# Patient Record
Sex: FEMALE | Race: BLACK OR AFRICAN AMERICAN | NOT HISPANIC OR LATINO | Employment: FULL TIME | URBAN - METROPOLITAN AREA
[De-identification: names, ages, dates, MRNs, and addresses within clinical notes are randomized per-mention and may not be internally consistent; named-entity substitution may affect disease eponyms.]

---

## 2017-11-28 ENCOUNTER — HISTORICAL (OUTPATIENT)
Dept: INTERNAL MEDICINE | Facility: CLINIC | Age: 27
End: 2017-11-28

## 2017-11-28 LAB
ABS NEUT (OLG): 3.28 X10(3)/MCL (ref 2.1–9.2)
ALBUMIN SERPL-MCNC: 3.8 GM/DL (ref 3.4–5)
ALBUMIN/GLOB SERPL: 1 RATIO (ref 1–2)
ALP SERPL-CCNC: 78 UNIT/L (ref 45–117)
ALT SERPL-CCNC: 20 UNIT/L (ref 12–78)
AST SERPL-CCNC: 15 UNIT/L (ref 15–37)
BASOPHILS # BLD AUTO: 0.04 X10(3)/MCL
BASOPHILS NFR BLD AUTO: 1 % (ref 0–1)
BILIRUB SERPL-MCNC: 0.2 MG/DL (ref 0.2–1)
BILIRUBIN DIRECT+TOT PNL SERPL-MCNC: <0.1 MG/DL
BILIRUBIN DIRECT+TOT PNL SERPL-MCNC: ABNORMAL MG/DL
BUN SERPL-MCNC: 8 MG/DL (ref 7–18)
CALCIUM SERPL-MCNC: 8.9 MG/DL (ref 8.5–10.1)
CHLORIDE SERPL-SCNC: 105 MMOL/L (ref 98–107)
CO2 SERPL-SCNC: 29 MMOL/L (ref 21–32)
CREAT SERPL-MCNC: 0.8 MG/DL (ref 0.6–1.3)
EOSINOPHIL # BLD AUTO: 0.04 10*3/UL
EOSINOPHIL NFR BLD AUTO: 1 % (ref 0–5)
ERYTHROCYTE [DISTWIDTH] IN BLOOD BY AUTOMATED COUNT: 20.3 % (ref 11.5–14.5)
FERRITIN SERPL-MCNC: 5.3 NG/ML (ref 10–150)
GLOBULIN SER-MCNC: 4.6 GM/ML (ref 2.3–3.5)
GLUCOSE SERPL-MCNC: 88 MG/DL (ref 74–106)
HCT VFR BLD AUTO: 35.2 % (ref 35–46)
HGB BLD-MCNC: 10.7 GM/DL (ref 12–16)
IMM GRANULOCYTES # BLD AUTO: 0.01 10*3/UL
IMM GRANULOCYTES NFR BLD AUTO: 0 %
IRON SATN MFR SERPL: 11.1 % (ref 15–50)
IRON SERPL-MCNC: 45 MCG/DL (ref 50–170)
LYMPHOCYTES # BLD AUTO: 2.38 X10(3)/MCL
LYMPHOCYTES NFR BLD AUTO: 36 % (ref 15–40)
MCH RBC QN AUTO: 21.5 PG (ref 26–34)
MCHC RBC AUTO-ENTMCNC: 30.4 GM/DL (ref 31–37)
MCV RBC AUTO: 70.8 FL (ref 80–100)
MONOCYTES # BLD AUTO: 0.78 X10(3)/MCL
MONOCYTES NFR BLD AUTO: 12 % (ref 4–12)
NEUTROPHILS # BLD AUTO: 3.28 X10(3)/MCL
NEUTROPHILS NFR BLD AUTO: 50 X10(3)/MCL
PLATELET # BLD AUTO: 318 X10(3)/MCL (ref 130–400)
PMV BLD AUTO: 10.5 FL (ref 7.4–10.4)
POTASSIUM SERPL-SCNC: 4.3 MMOL/L (ref 3.5–5.1)
PROT SERPL-MCNC: 8.4 GM/DL (ref 6.4–8.2)
RBC # BLD AUTO: 4.97 X10(6)/MCL (ref 4–5.2)
RET# (OHS): 0.06 X10(6)/MCL (ref 0.02–0.08)
RETICULOCYTE COUNT AUTOMATED (OLG): 1.2 % (ref 0.5–1.5)
SODIUM SERPL-SCNC: 139 MMOL/L (ref 136–145)
TIBC SERPL-MCNC: 406 MCG/DL (ref 250–450)
TRANSFERRIN SERPL-MCNC: 345 MG/DL (ref 200–360)
WBC # SPEC AUTO: 6.5 X10(3)/MCL (ref 4.5–11)

## 2018-06-06 ENCOUNTER — HISTORICAL (OUTPATIENT)
Dept: ADMINISTRATIVE | Facility: HOSPITAL | Age: 28
End: 2018-06-06

## 2018-06-06 LAB
ABS NEUT (OLG): 2.5 X10(3)/MCL (ref 2.1–9.2)
BASOPHILS # BLD AUTO: 0.04 X10(3)/MCL
BASOPHILS NFR BLD AUTO: 1 %
BUN SERPL-MCNC: 10 MG/DL (ref 7–18)
CALCIUM SERPL-MCNC: 9 MG/DL (ref 8.5–10.1)
CHLORIDE SERPL-SCNC: 104 MMOL/L (ref 98–107)
CO2 SERPL-SCNC: 29 MMOL/L (ref 21–32)
CREAT SERPL-MCNC: 0.8 MG/DL (ref 0.6–1.3)
CREAT/UREA NIT SERPL: 12
EOSINOPHIL # BLD AUTO: 0.05 X10(3)/MCL
EOSINOPHIL NFR BLD AUTO: 1 %
ERYTHROCYTE [DISTWIDTH] IN BLOOD BY AUTOMATED COUNT: 15.3 % (ref 11.5–14.5)
GLUCOSE SERPL-MCNC: 80 MG/DL (ref 74–106)
HCT VFR BLD AUTO: 38.4 % (ref 35–46)
HGB BLD-MCNC: 11.7 GM/DL (ref 12–16)
IMM GRANULOCYTES # BLD AUTO: 0.01 10*3/UL
IMM GRANULOCYTES NFR BLD AUTO: 0 %
LYMPHOCYTES # BLD AUTO: 2.83 X10(3)/MCL
LYMPHOCYTES NFR BLD AUTO: 46 % (ref 13–40)
MCH RBC QN AUTO: 22.7 PG (ref 26–34)
MCHC RBC AUTO-ENTMCNC: 30.5 GM/DL (ref 31–37)
MCV RBC AUTO: 74.6 FL (ref 80–100)
MONOCYTES # BLD AUTO: 0.72 X10(3)/MCL
MONOCYTES NFR BLD AUTO: 12 % (ref 4–12)
NEUTROPHILS # BLD AUTO: 2.5 X10(3)/MCL
NEUTROPHILS NFR BLD AUTO: 41 X10(3)/MCL
PLATELET # BLD AUTO: 291 X10(3)/MCL (ref 130–400)
PMV BLD AUTO: 10.5 FL (ref 7.4–10.4)
POTASSIUM SERPL-SCNC: 4.1 MMOL/L (ref 3.5–5.1)
RBC # BLD AUTO: 5.15 X10(6)/MCL (ref 4–5.2)
SODIUM SERPL-SCNC: 140 MMOL/L (ref 136–145)
WBC # SPEC AUTO: 6.2 X10(3)/MCL (ref 4.5–11)

## 2021-04-12 LAB
PAP RECOMMENDATION EXT: NORMAL
PAP SMEAR: NORMAL

## 2021-05-17 ENCOUNTER — HISTORICAL (OUTPATIENT)
Dept: RADIOLOGY | Facility: HOSPITAL | Age: 31
End: 2021-05-17

## 2021-05-17 ENCOUNTER — HISTORICAL (OUTPATIENT)
Dept: ADMINISTRATIVE | Facility: HOSPITAL | Age: 31
End: 2021-05-17

## 2021-05-17 LAB
ABS NEUT (OLG): 2.74 X10(3)/MCL (ref 2.1–9.2)
ALBUMIN SERPL-MCNC: 4.1 GM/DL (ref 3.5–5)
ALBUMIN/GLOB SERPL: 1.2 RATIO (ref 1.1–2)
ALP SERPL-CCNC: 74 UNIT/L (ref 40–150)
ALT SERPL-CCNC: 25 UNIT/L (ref 0–55)
APPEARANCE, UA: ABNORMAL
AST SERPL-CCNC: 21 UNIT/L (ref 5–34)
BACTERIA SPEC CULT: ABNORMAL /HPF
BASOPHILS # BLD AUTO: 0.1 X10(3)/MCL (ref 0–0.2)
BASOPHILS NFR BLD AUTO: 1 %
BILIRUB SERPL-MCNC: 0.3 MG/DL
BILIRUB UR QL STRIP: NEGATIVE
BILIRUBIN DIRECT+TOT PNL SERPL-MCNC: 0.1 MG/DL (ref 0–0.5)
BILIRUBIN DIRECT+TOT PNL SERPL-MCNC: 0.2 MG/DL (ref 0–0.8)
BUN SERPL-MCNC: 8.9 MG/DL (ref 7–18.7)
CALCIUM SERPL-MCNC: 9.2 MG/DL (ref 8.4–10.2)
CHLORIDE SERPL-SCNC: 104 MMOL/L (ref 98–107)
CHOLEST SERPL-MCNC: 152 MG/DL
CHOLEST/HDLC SERPL: 3 {RATIO} (ref 0–5)
CO2 SERPL-SCNC: 26 MMOL/L (ref 22–29)
COLOR UR: YELLOW
CREAT SERPL-MCNC: 0.82 MG/DL (ref 0.55–1.02)
EOSINOPHIL # BLD AUTO: 0.2 X10(3)/MCL (ref 0–0.9)
EOSINOPHIL NFR BLD AUTO: 3 %
ERYTHROCYTE [DISTWIDTH] IN BLOOD BY AUTOMATED COUNT: 16 % (ref 11.5–17)
EST. AVERAGE GLUCOSE BLD GHB EST-MCNC: 116.9 MG/DL
GLOBULIN SER-MCNC: 3.5 GM/DL (ref 2.4–3.5)
GLUCOSE (UA): NEGATIVE
GLUCOSE SERPL-MCNC: 90 MG/DL (ref 74–100)
HBA1C MFR BLD: 5.7 %
HCT VFR BLD AUTO: 37.9 % (ref 37–47)
HDLC SERPL-MCNC: 54 MG/DL (ref 35–60)
HGB BLD-MCNC: 11.4 GM/DL (ref 12–16)
HGB UR QL STRIP: NEGATIVE
IRON SERPL-MCNC: 107 UG/DL (ref 50–170)
KETONES UR QL STRIP: NEGATIVE
LDLC SERPL CALC-MCNC: 83 MG/DL (ref 50–140)
LEUKOCYTE ESTERASE UR QL STRIP: NEGATIVE
LYMPHOCYTES # BLD AUTO: 3.3 X10(3)/MCL (ref 0.6–4.6)
LYMPHOCYTES NFR BLD AUTO: 45 %
MCH RBC QN AUTO: 23.2 PG (ref 27–31)
MCHC RBC AUTO-ENTMCNC: 30.1 GM/DL (ref 33–36)
MCV RBC AUTO: 77 FL (ref 80–94)
MONOCYTES # BLD AUTO: 1 X10(3)/MCL (ref 0.1–1.3)
MONOCYTES NFR BLD AUTO: 13 %
NEUTROPHILS # BLD AUTO: 2.74 X10(3)/MCL (ref 2.1–9.2)
NEUTROPHILS NFR BLD AUTO: 37 %
NITRITE UR QL STRIP: NEGATIVE
PH UR STRIP: 7 [PH] (ref 5–9)
PLATELET # BLD AUTO: 331 X10(3)/MCL (ref 130–400)
PMV BLD AUTO: 11.3 FL (ref 9.4–12.4)
POTASSIUM SERPL-SCNC: 3.9 MMOL/L (ref 3.5–5.1)
PROT SERPL-MCNC: 7.6 GM/DL (ref 6.4–8.3)
PROT UR QL STRIP: NEGATIVE
RBC # BLD AUTO: 4.92 X10(6)/MCL (ref 4.2–5.4)
RBC #/AREA URNS HPF: ABNORMAL /[HPF]
SODIUM SERPL-SCNC: 140 MMOL/L (ref 136–145)
SP GR UR STRIP: 1.02 (ref 1–1.03)
SQUAMOUS EPITHELIAL, UA: ABNORMAL /HPF (ref 0–4)
TRIGL SERPL-MCNC: 73 MG/DL (ref 37–140)
TSH SERPL-ACNC: 3.04 UIU/ML (ref 0.35–4.94)
UROBILINOGEN UR STRIP-ACNC: 1
VIT B12 SERPL-MCNC: 492 PG/ML (ref 213–816)
VLDLC SERPL CALC-MCNC: 15 MG/DL
WBC # SPEC AUTO: 7.4 X10(3)/MCL (ref 4.5–11.5)
WBC #/AREA URNS HPF: ABNORMAL /[HPF]

## 2022-01-20 ENCOUNTER — HISTORICAL (OUTPATIENT)
Dept: LAB | Facility: HOSPITAL | Age: 32
End: 2022-01-20

## 2022-01-20 LAB
ABS NEUT (OLG): 2.53 X10(3)/MCL (ref 2.1–9.2)
ALBUMIN SERPL-MCNC: 4 GM/DL (ref 3.5–5)
ALBUMIN/GLOB SERPL: 1.1 RATIO (ref 1.1–2)
ALP SERPL-CCNC: 68 UNIT/L (ref 40–150)
ALT SERPL-CCNC: 9 UNIT/L (ref 0–55)
AST SERPL-CCNC: 13 UNIT/L (ref 5–34)
BASOPHILS # BLD AUTO: 0.06 X10(3)/MCL (ref 0–0.2)
BASOPHILS NFR BLD AUTO: 1 % (ref 0–1)
BILIRUB SERPL-MCNC: 0.2 MG/DL (ref 0.2–1.2)
BILIRUBIN DIRECT+TOT PNL SERPL-MCNC: 0.1 MG/DL (ref 0–0.5)
BILIRUBIN DIRECT+TOT PNL SERPL-MCNC: 0.1 MG/DL (ref 0–0.8)
BUN SERPL-MCNC: 6.6 MG/DL (ref 7–18.7)
CALCIUM SERPL-MCNC: 9.1 MG/DL (ref 8.4–10.2)
CHLORIDE SERPL-SCNC: 106 MMOL/L (ref 98–107)
CO2 SERPL-SCNC: 28 MMOL/L (ref 22–29)
CREAT SERPL-MCNC: 0.81 MG/DL (ref 0.57–1.11)
EOSINOPHIL # BLD AUTO: 0.19 X10(3)/MCL (ref 0–0.9)
EOSINOPHIL NFR BLD AUTO: 3.3 % (ref 0–6.4)
ERYTHROCYTE [DISTWIDTH] IN BLOOD BY AUTOMATED COUNT: 15 % (ref 11.5–17)
EST. AVERAGE GLUCOSE BLD GHB EST-MCNC: 114 MG/DL
GLOBULIN SER-MCNC: 3.5 GM/DL (ref 2.4–3.5)
GLUCOSE SERPL-MCNC: 90 MG/DL (ref 74–100)
HBA1C MFR BLD: 5.6 %
HCT VFR BLD AUTO: 35.4 % (ref 37–47)
HGB BLD-MCNC: 11.1 GM/DL (ref 12–16)
IMM GRANULOCYTES # BLD AUTO: 0.01 10*3/UL (ref 0–0.02)
IMM GRANULOCYTES NFR BLD AUTO: 0.2 % (ref 0–0.43)
IRON SERPL-MCNC: 32 UG/DL (ref 50–170)
LYMPHOCYTES # BLD AUTO: 2.36 X10(3)/MCL (ref 0.6–4.6)
LYMPHOCYTES NFR BLD AUTO: 40.9 % (ref 16–44)
MCH RBC QN AUTO: 23.5 PG (ref 27–31)
MCHC RBC AUTO-ENTMCNC: 31.4 GM/DL (ref 33–36)
MCV RBC AUTO: 75 FL (ref 80–94)
MONOCYTES # BLD AUTO: 0.62 X10(3)/MCL (ref 0.1–1.3)
MONOCYTES NFR BLD AUTO: 10.7 % (ref 4–12.1)
NEUTROPHILS # BLD AUTO: 2.53 X10(3)/MCL (ref 2.1–9.2)
NEUTROPHILS NFR BLD AUTO: 43.9 % (ref 43–73)
NRBC BLD AUTO-RTO: 0 % (ref 0–0.2)
PLATELET # BLD AUTO: 318 X10(3)/MCL (ref 130–400)
PMV BLD AUTO: 10.7 FL (ref 7.4–10.4)
POTASSIUM SERPL-SCNC: 3.9 MMOL/L (ref 3.5–5.1)
PROT SERPL-MCNC: 7.5 GM/DL (ref 6.4–8.3)
RBC # BLD AUTO: 4.72 X10(6)/MCL (ref 4.2–5.4)
SODIUM SERPL-SCNC: 141 MMOL/L (ref 136–145)
WBC # SPEC AUTO: 5.8 X10(3)/MCL (ref 4.5–11.5)

## 2022-01-31 ENCOUNTER — HISTORICAL (OUTPATIENT)
Dept: ADMINISTRATIVE | Facility: HOSPITAL | Age: 32
End: 2022-01-31

## 2022-04-10 ENCOUNTER — HISTORICAL (OUTPATIENT)
Dept: ADMINISTRATIVE | Facility: HOSPITAL | Age: 32
End: 2022-04-10
Payer: COMMERCIAL

## 2022-04-29 VITALS
DIASTOLIC BLOOD PRESSURE: 68 MMHG | OXYGEN SATURATION: 98 % | HEIGHT: 66 IN | SYSTOLIC BLOOD PRESSURE: 101 MMHG | SYSTOLIC BLOOD PRESSURE: 124 MMHG | BODY MASS INDEX: 31.78 KG/M2 | DIASTOLIC BLOOD PRESSURE: 78 MMHG | WEIGHT: 197.75 LBS | WEIGHT: 194.69 LBS | HEIGHT: 66 IN | BODY MASS INDEX: 31.29 KG/M2

## 2022-05-03 NOTE — HISTORICAL [facility]
This is a historical note converted from Ken. Formatting and pictures may have been removed.  Please reference Ken for original formatting and attached multimedia. Chief Complaint  Follow up appt  History of Present Illness  28-year-old -American female presents for follow-up appointment.? Today she is complaining of?right knee pain which has been ongoing for quite some time now.? Patient denies history of trauma?and says that it normally hurts when she sits or stands for too long. ?She has tried over-the-counter Advil only but says she does like to take medications.  She has been compliant with her ferrous sulfate but tends to take it only twice a day as?3 times daily causes her to become too constipated. ?However, she has not tried Colace or MiraLAX over-the-counter.  Review of Systems  ????Constitutional: No fever, No chills, No weakness, No fatigue.  ?????Eye: No recent visual problem.  ?????Respiratory: No shortness of breath, No cough, No sputum production, No wheezing.  ?????Cardiovascular: No chest pain, No palpitations, No peripheral edema.  ?????Gastrointestinal: No nausea, No vomiting, No diarrhea, No constipation, No heartburn, No abdominal pain.  ?????Genitourinary: No dysuria.  ?????Endocrine: No excessive thirst, No polyuria, No cold intolerance, No heat intolerance.  ?????Musculoskeletal: No back pain, right knee?pain, No decreased range of motion.  ?????Integumentary: No rash, No pruritus.  ?????Neurologic: Alert and oriented X4, No numbness, No tingling, No headache.  ?????Psychiatric: No anxiety, No depression.  Physical Exam  Vitals & Measurements  T:?36.9? ?C (Oral)? HR:?76(Peripheral)? RR:?18? BP:?101/68?  HT:?168?cm? HT:?168?cm? HT:?168?cm? WT:?88.3?kg? WT:?88.3?kg? WT:?88.3?kg? BMI:?31.29?  General: Alert and oriented, No acute distress.  ?????Eye: Pupils are equal, round and reactive to light, Extraocular movements are intact, Normal conjunctiva.  ?????HENT: Normocephalic, Oral  mucosa is moist, No pharyngeal erythema.  ?????Neck: Supple, Non-tender.  ?????Respiratory: Lungs are clear to auscultation, Respirations are non-labored.  ?????Cardiovascular: Normal rate, Regular rhythm, No murmur, Good pulses equal in all extremities, No edema.  ?????Gastrointestinal: Soft, Non-tender, Non-distended, Normal bowel sounds.  ?????Musculoskeletal:?Right knee reveals no?edema, no?erythema,?no tenderness to palpation, no crepitus,?normal range of motion, Normal strength.  ?????Integumentary: Warm, Dry.  ?????Neurologic: Alert, Oriented.  ?????Cognition and Speech: Oriented, Speech clear and coherent.  ?????Psychiatric: Cooperative, Appropriate mood & affect.  Assessment/Plan  Ordered:  ferrous sulfate, 325 mg = 1 tab(s), Oral, BID, # 60 tab(s), 11 Refill(s), Pharmacy: [id number]  Basic Metabolic Panel, Routine collect, *Est. 06/06/18 3:00:00 CDT, Blood, Order for future visit, *Est. Stop date 06/06/18 3:00:00 CDT, Lab Collect, Anemia  Restless leg syndrome, 06/06/18 11:18:00 CDT  CBC w/ Auto Diff, Routine collect, *Est. 05/06/19 3:00:00 CDT, Blood, Order for future visit, *Est. Stop date 05/06/19 3:00:00 CDT, Lab Collect, Anemia  Restless leg syndrome  Wellness examination, 06/06/18 11:19:00 CDT  CBC w/ Auto Diff, Routine collect, *Est. 06/06/18 3:00:00 CDT, Blood, Order for future visit, *Est. Stop date 06/06/18 3:00:00 CDT, Lab Collect, Anemia  Restless leg syndrome, 06/06/18 11:18:00 CDT  Clinic Follow up, *Est. 05/13/19 7:30:00 CDT, Order for future visit, Restless leg syndrome, [facility]  Comprehensive Metabolic Panel, Routine collect, *Est. 05/06/19 3:00:00 CDT, Blood, Order for future visit, *Est. Stop date 05/06/19 3:00:00 CDT, Lab Collect, Anemia  Restless leg syndrome  Wellness examination, 06/06/18 11:19:00 CDT  XR Knee Right 1 or 2 Views, Routine, *Est. 06/06/18 3:00:00 CDT, Pain, None, Ambulatory, Rad Type, Order for future visit, Right knee pain, *Est. 06/06/18 3:00:00  CDT  ?  1.? Anemia:?CBC with differential and BMP today.? May continue ferrous sulfate?supplementation.  2.? Migraines?without aura:?Stable on Fioricet.  3.? Restless leg syndrome:?Improved and stable.  4.? Wellness: Labs as above 1 week prior to follow-up appointment in 11 months.  5.? Chronic?right knee pain: X-ray today. ?Advised patient on alternating Aleve with Tylenol over-the-counter as needed only.  [1]  Patient voiced understanding.   Problem List/Past Medical History  Ongoing  Obesity  S/P tubal ligation  Historical  none   delivery  Procedure/Surgical History  Other bilateral ligation and division of fallopian tubes (2013), Other manually assisted delivery (2013), DIRECT ADMISSION OF PATIENT FOR HOSPITAL OBSERVATION CARE (2013), HOSPITAL OBSERVATION SERVICE, PER HOUR (2013), DIRECT ADMISSION OF PATIENT FOR HOSPITAL OBSERVATION CARE (2013), HOSPITAL OBSERVATION SERVICE, PER HOUR (2013), Tubal ligation (), Medical induction of labor (2012), Other manually assisted delivery (2012), DIRECT ADMISSION OF PATIENT FOR HOSPITAL OBSERVATION CARE (2012), HOSPITAL OBSERVATION SERVICE, PER HOUR (2012), DIRECT ADMISSION OF PATIENT FOR HOSPITAL OBSERVATION CARE (2012), HOSPITAL OBSERVATION SERVICE, PER HOUR (2012), tonsilectomy (2007).  Medications  acetaminophen/butalbital/caffeine 325 mg-50 mg-40 mg oral tablet, 1 tab(s), Oral, q4hr, PRN, 1 refills  ferrous sulfate 325 mg (65 mg elemental iron) oral tablet, 325 mg= 1 tab(s), Oral, TID, 5 refills  Allergies  No Known Allergies  Social History  Alcohol - No Risk, 2012  Never, 2017  Employment/School - No Risk, 2012  Employed, Highest education level: High school., 2017  Exercise  Exercise frequency: 1-2 times/week. Exercise type: cycling ., 2017  Home/Environment - No Risk, 2012  Lives with Children, Spouse. Living situation: Home/Independent.  Alcohol abuse in household: No. Substance abuse in household: No. Smoker in household: No. Feels unsafe at home: No. Safe place to go: Yes. Family/Friends available for support: Yes. Concern for family members at home: No. Major illness in household: No., 11/28/2017  Nutrition/Health - No Risk, 05/08/2012  Regular, Wants to lose weight: Yes. Sleeping concerns: Yes. Feels highly stressed: No., 11/28/2017  Substance Abuse - No Risk, 05/08/2012  Never, 09/21/2017  Tobacco - No Risk, 05/08/2012  Never smoker, 09/21/2017  Family History  Diabetes mellitus type 1: Sister.  Healthy adult: Mother.  Sickle cell trait: Father.  Ulcerative colitis.: Brother.  Immunizations  Vaccine Date Status   influenza virus vaccine, inactivated 11/28/2017 Given      [1]?IM Office Visit Note; Jennifer Smalls MD 11/28/2017 08:23 CST

## 2022-05-17 ENCOUNTER — OFFICE VISIT (OUTPATIENT)
Dept: FAMILY MEDICINE | Facility: CLINIC | Age: 32
End: 2022-05-17
Payer: COMMERCIAL

## 2022-05-17 VITALS
SYSTOLIC BLOOD PRESSURE: 112 MMHG | WEIGHT: 200.63 LBS | TEMPERATURE: 98 F | OXYGEN SATURATION: 99 % | RESPIRATION RATE: 16 BRPM | DIASTOLIC BLOOD PRESSURE: 72 MMHG | BODY MASS INDEX: 32.24 KG/M2 | HEART RATE: 72 BPM | HEIGHT: 66 IN

## 2022-05-17 DIAGNOSIS — E66.9 OBESITY (BMI 30-39.9): ICD-10-CM

## 2022-05-17 DIAGNOSIS — D50.8 OTHER IRON DEFICIENCY ANEMIA: ICD-10-CM

## 2022-05-17 DIAGNOSIS — Z00.00 ENCOUNTER FOR PREVENTATIVE ADULT HEALTH CARE EXAMINATION: Primary | ICD-10-CM

## 2022-05-17 PROBLEM — M25.561 RIGHT KNEE PAIN: Chronic | Status: ACTIVE | Noted: 2022-05-17

## 2022-05-17 PROBLEM — R73.03 PREDIABETES: Status: RESOLVED | Noted: 2022-05-17 | Resolved: 2022-05-17

## 2022-05-17 PROBLEM — R73.03 PREDIABETES: Chronic | Status: ACTIVE | Noted: 2022-05-17

## 2022-05-17 PROBLEM — M25.511 RIGHT SHOULDER PAIN: Chronic | Status: ACTIVE | Noted: 2022-05-17

## 2022-05-17 PROBLEM — D50.9 IRON DEFICIENCY ANEMIA: Chronic | Status: ACTIVE | Noted: 2022-05-17

## 2022-05-17 PROBLEM — M25.511 RIGHT SHOULDER PAIN: Status: ACTIVE | Noted: 2022-05-17

## 2022-05-17 PROBLEM — M41.9 SCOLIOSIS: Chronic | Status: ACTIVE | Noted: 2022-05-17

## 2022-05-17 PROBLEM — M54.50 LOW BACK PAIN: Status: ACTIVE | Noted: 2022-05-17

## 2022-05-17 PROBLEM — M54.50 LOW BACK PAIN: Chronic | Status: ACTIVE | Noted: 2022-05-17

## 2022-05-17 PROBLEM — D50.9 IRON DEFICIENCY ANEMIA: Status: ACTIVE | Noted: 2022-05-17

## 2022-05-17 PROBLEM — M25.561 RIGHT KNEE PAIN: Status: ACTIVE | Noted: 2022-05-17

## 2022-05-17 PROBLEM — R73.03 PREDIABETES: Status: ACTIVE | Noted: 2022-05-17

## 2022-05-17 PROBLEM — M41.9 SCOLIOSIS: Status: ACTIVE | Noted: 2022-05-17

## 2022-05-17 PROCEDURE — 3074F SYST BP LT 130 MM HG: CPT | Mod: CPTII,,, | Performed by: FAMILY MEDICINE

## 2022-05-17 PROCEDURE — 1159F MED LIST DOCD IN RCRD: CPT | Mod: CPTII,,, | Performed by: FAMILY MEDICINE

## 2022-05-17 PROCEDURE — 1159F PR MEDICATION LIST DOCUMENTED IN MEDICAL RECORD: ICD-10-PCS | Mod: CPTII,,, | Performed by: FAMILY MEDICINE

## 2022-05-17 PROCEDURE — 3074F PR MOST RECENT SYSTOLIC BLOOD PRESSURE < 130 MM HG: ICD-10-PCS | Mod: CPTII,,, | Performed by: FAMILY MEDICINE

## 2022-05-17 PROCEDURE — 3078F DIAST BP <80 MM HG: CPT | Mod: CPTII,,, | Performed by: FAMILY MEDICINE

## 2022-05-17 PROCEDURE — 99395 PR PREVENTIVE VISIT,EST,18-39: ICD-10-PCS | Mod: ,,, | Performed by: FAMILY MEDICINE

## 2022-05-17 PROCEDURE — 3008F BODY MASS INDEX DOCD: CPT | Mod: CPTII,,, | Performed by: FAMILY MEDICINE

## 2022-05-17 PROCEDURE — 1160F RVW MEDS BY RX/DR IN RCRD: CPT | Mod: CPTII,,, | Performed by: FAMILY MEDICINE

## 2022-05-17 PROCEDURE — 1160F PR REVIEW ALL MEDS BY PRESCRIBER/CLIN PHARMACIST DOCUMENTED: ICD-10-PCS | Mod: CPTII,,, | Performed by: FAMILY MEDICINE

## 2022-05-17 PROCEDURE — 99395 PREV VISIT EST AGE 18-39: CPT | Mod: ,,, | Performed by: FAMILY MEDICINE

## 2022-05-17 PROCEDURE — 3078F PR MOST RECENT DIASTOLIC BLOOD PRESSURE < 80 MM HG: ICD-10-PCS | Mod: CPTII,,, | Performed by: FAMILY MEDICINE

## 2022-05-17 PROCEDURE — 3008F PR BODY MASS INDEX (BMI) DOCUMENTED: ICD-10-PCS | Mod: CPTII,,, | Performed by: FAMILY MEDICINE

## 2022-05-17 RX ORDER — ALPRAZOLAM 0.25 MG/1
0.25 TABLET ORAL DAILY PRN
COMMUNITY
Start: 2022-01-26 | End: 2023-09-11 | Stop reason: SDUPTHER

## 2022-05-17 RX ORDER — FERROUS GLUCONATE 324(38)MG
1 TABLET ORAL 2 TIMES DAILY WITH MEALS
COMMUNITY
Start: 2022-01-26

## 2022-05-17 RX ORDER — LEVOCETIRIZINE DIHYDROCHLORIDE 5 MG/1
5 TABLET, FILM COATED ORAL NIGHTLY PRN
COMMUNITY
Start: 2022-01-26

## 2022-05-17 NOTE — PROGRESS NOTES
Patient ID: [id number]     Chief Complaint: Wellness        HPI:     Jaycee Marie is a 32 y.o. female with history of prediabetes, obesity and iron deficiency here today for an annual wellness visit. No other complaints today.   The patient describes her overall health as great and states that she eats a well-balanced diet low in carbohydrates and red meat.  She admits to an active lifestyle and states that she goes to the gym approximately 2-3 times per week.  She states that on days that she is not in the gym she exercises at home.  She admits to being sexually active and denies using contraception.  She denies history of STDs and states that she is not interested in STD testing.  She denies tobacco use or illicit drug use but admits to drinking alcohol 2-3 times per month.  The patient states that she drinks coffee 2-3 times per week and Coca-Cola a few times per week.  The patient states her last menstrual period was in April 2022, irregular and last 6-7 days however recently she had prolonged menses that lasted 3 weeks.  She states that she has an appointment to see a gynecologist however she is unsure of the doctor because her previous doctor has moved to [place].     Preventative Health:  Cervical Cancer Screening - Last Pap 2 years ago per patient. She has an appointment with [facility] at [facility] in a few months.  Breast Cancer Screening - Due at age 39. Admits to family history of breast cancer in great aunts but not parens or siblings.  Colon Cancer Screening - Colonoscopy due at age 44. Denies family history of colon cancer.  Osteoporosis Screening - Not yet due  Eye Exam - Last eye exam in 2021 per patient.  Dental Exam - Last dental exam 2/2022, patient has braces and has a follow up appointment 8/2022.  Vaccinations -   Immunization History   Administered Date(s) Administered    COVID-19, MRNA, LN-S, PF (Pfizer) (Purple Cap) 12/01/2021, 12/22/2021    DTP 1989, 1989, 1989,  09/25/1990, 06/25/1993    HIB 09/25/1990    HPV 9-Valent 06/21/2007, 09/11/2007, 01/10/2008    HPV Quadrivalent 06/21/2007, 09/11/2007, 01/10/2008    Hepatitis B, Pediatric/Adolescent 09/11/2007, 10/09/2007, 01/10/2008    Influenza 11/28/2017    Influenza - Quadrivalent - PF (6-35 months) 11/28/2017    Influenza - Trivalent (ADULT) 10/05/2006, 10/27/2010    Influenza - Trivalent - PF (ADULT) 10/05/2006, 10/27/2010, 10/12/2011    MMR 06/29/1990, 06/25/1993    Meningococcal Conjugate (MCV4P) 10/05/2006    OPV 1989, 1989, 09/25/1990, 06/25/1993    Tdap 09/11/2007, 05/17/2021        Past Medical History:  History of tubal ligation  Iron deficiency anemia  Low back pain  Obesity  Prediabetes  Right knee pain  Right shoulder pain  Scoliosis     Past Surgical History:   Procedure Laterality Date    TONSILLECTOMY N/A     TUBAL LIGATION N/A        Review of patient's allergies indicates:  No Known Allergies    Outpatient Medications Marked as Taking for the 5/17/22 encounter (Office Visit) with PROVIDER, [facility]   Medication Sig Dispense Refill    ALPRAZolam (XANAX) 0.25 MG tablet Take 0.25 mg by mouth daily as needed.      ferrous gluconate (FERGON) 324 MG tablet Take 1 tablet by mouth 2 (two) times daily with meals.      levocetirizine (XYZAL) 5 MG tablet TAKE 1 TABLET BY MOUTH EVERY DAY IN THE MORNING AS NEEDED FOR ALLERGY SYMPTOMS 90 tablet 3    levocetirizine (XYZAL) 5 MG tablet Take 5 mg by mouth nightly as needed.         Social History     Socioeconomic History    Marital status:    Tobacco Use    Smoking status: Never Smoker    Smokeless tobacco: Never Used   Substance and Sexual Activity    Alcohol use: Yes     Comment: 1-2 times per month    Drug use: Never    Sexual activity: Yes     Partners: Male        Family History   Problem Relation Age of Onset    Epilepsy Father         Lab Results   Component Value Date    WBC 5.8 01/20/2022    HGB 11.1 (L)  "01/20/2022    HCT 35.4 (L) 01/20/2022     01/20/2022    CHOL 152 05/17/2021    TRIG 73 05/17/2021    HDL 54 05/17/2021    ALT 9 01/20/2022    AST 13 01/20/2022     01/20/2022    K 3.9 01/20/2022    CREATININE 0.81 01/20/2022    BUN 6.6 (L) 01/20/2022    CO2 28 01/20/2022    TSH 3.0372 05/17/2021    HGBA1C 5.6 01/20/2022       US Transvaginal Non OB  History: Refractory anemia, menorrhagia    Uterus: 9.2 x 6 x 4.7 cm  Endometrial stripe: 5 mm  Right ovary: 3.9 x 2.4 x 2.4 cm  Left ovary: 4 x 2.2 x 2.5 cm    Transvaginal pelvic ultrasound was performed. The uterus is normal in  size. No myometrial mass is seen. The endometrial stripe is not  thickened. There is a normal stimulated appearance of the ovaries,  with normal ovarian blood flow. A physiologic quantity of free pelvic  fluid is observed.    IMPRESSION: Unremarkable transvaginal pelvic ultrasound.  Electronically Signed By: Benny Cruz MD  Date/Time Signed: 01/31/2022 17:03       Subjective:     Review of Systems:   Review of Systems   Constitutional: Negative for chills, fever and weight loss.   Eyes: Negative for blurred vision and double vision.   Respiratory: Negative for cough and shortness of breath.    Cardiovascular: Negative for chest pain.   Gastrointestinal: Negative for diarrhea, nausea and vomiting.   Musculoskeletal: Negative for myalgias.   Skin: Negative for rash.   Neurological: Negative for dizziness, tremors, weakness and headaches.   All other systems reviewed and are negative.       See HPI for details    Objective:     /72 (BP Location: Left arm, Patient Position: Sitting, BP Method: Small (Manual))   Pulse 72   Temp 98.1 °F (36.7 °C) (Oral)   Resp 16   Ht 5' 6" (1.676 m)   Wt 91 kg (200 lb 9.6 oz)   LMP 04/29/2022 Comment: Tubaligation-2013  SpO2 99%   BMI 32.38 kg/m²     Physical Exam:  Physical Exam  Constitutional:       General: She is not in acute distress.     Appearance: Normal appearance. She is " normal weight.   HENT:      Head: Normocephalic and atraumatic.      Right Ear: Tympanic membrane, ear canal and external ear normal.      Left Ear: Tympanic membrane, ear canal and external ear normal.      Nose: Nose normal.      Mouth/Throat:      Mouth: Mucous membranes are moist.      Pharynx: Oropharynx is clear. No oropharyngeal exudate or posterior oropharyngeal erythema.   Eyes:      Extraocular Movements: Extraocular movements intact.      Conjunctiva/sclera: Conjunctivae normal.      Pupils: Pupils are equal, round, and reactive to light.   Cardiovascular:      Rate and Rhythm: Normal rate and regular rhythm.      Pulses: Normal pulses.      Heart sounds: Normal heart sounds. No murmur heard.    No gallop.   Pulmonary:      Effort: Pulmonary effort is normal. No respiratory distress.      Breath sounds: Normal breath sounds. No wheezing or rhonchi.   Abdominal:      General: Bowel sounds are normal. There is no distension.      Palpations: Abdomen is soft. There is no mass.      Tenderness: There is no abdominal tenderness. There is no guarding or rebound.   Musculoskeletal:         General: Normal range of motion.      Cervical back: Normal range of motion and neck supple.   Skin:     General: Skin is warm and dry.      Capillary Refill: Capillary refill takes less than 2 seconds.   Neurological:      General: No focal deficit present.      Mental Status: She is alert and oriented to person, place, and time. Mental status is at baseline.      Motor: Motor function is intact. No weakness.   Psychiatric:         Mood and Affect: Mood normal.         Thought Content: Thought content normal.         Judgment: Judgment normal.         Assessment:       ICD-10-CM ICD-9-CM   1. Encounter for preventative adult health care examination  Z00.00 V70.0   2. Obesity (BMI 30-39.9)  E66.9 278.00   3. Other iron deficiency anemia  D50.8 280.8        Plan:       Health Maintenance Topics with due status: Not Due        Topic Last Completion Date    Influenza Vaccine 11/28/2017    TETANUS VACCINE 05/17/2021        1. Encounter for preventative adult health care examination  - Annual eye exam and biannual dental exams recommended.  - Monthly self breast exam recommended.  - Wellness labs ordered and pending. Patient will be notified of results and treated appropriately.  - Limit caffeine intake  - Moderate intensity exercise 30 min/day approximately 5 days per week recommended.  - Patient counseled on well balanced diet that includes vegetables, fruit and lean meats.  - Patient declined STD testing.    2. Obesity (BMI 30-39.9)  - Patient has hx of prediabetes that improved with last A1c 5.6 in 1/2022.  - Repeat A1c, CMP, UA and Vitamin D ordered and pending.  - Counseled on diet and exercise as above.  - At least 10% weight loss recommended.    3. Other iron deficiency anemia  - Repeat CBC ordered and pending  - Continue ferrous gluconate 324mg daily  - Increase consumption of iron-rich foods as discussed.  - Keep follow up with gynecology for intermittent prolonged menses.  - No acute abnormalities on Transvag US of Pelvis      Jaycee was seen today for wellness.    Diagnoses and all orders for this visit:    Encounter for preventative adult health care examination  -     Vitamin D; Future  -     TSH; Future  -     Lipid Panel; Future  -     Comprehensive Metabolic Panel; Future  -     Urinalysis, Reflex to Urine Culture Urine, Clean Catch; Future  -     HCG, Serum, Qualitative; Future    Obesity (BMI 30-39.9)  -     Vitamin D; Future  -     Lipid Panel; Future  -     Comprehensive Metabolic Panel; Future  -     Urinalysis, Reflex to Urine Culture Urine, Clean Catch; Future  -     Hemoglobin A1C; Future  -     HCG, Serum, Qualitative; Future    Other iron deficiency anemia  -     Vitamin D; Future  -     CBC Auto Differential; Future  -     Urinalysis, Reflex to Urine Culture Urine, Clean Catch; Future            Follow up in about  6 months (around 11/17/2022) for Anemia, Obesity.

## 2023-01-19 ENCOUNTER — PATIENT MESSAGE (OUTPATIENT)
Dept: PRIMARY CARE CLINIC | Facility: CLINIC | Age: 33
End: 2023-01-19
Payer: COMMERCIAL

## 2023-06-15 ENCOUNTER — TELEPHONE (OUTPATIENT)
Dept: FAMILY MEDICINE | Facility: CLINIC | Age: 33
End: 2023-06-15
Payer: COMMERCIAL

## 2023-06-15 DIAGNOSIS — Z00.00 WELLNESS EXAMINATION: Primary | ICD-10-CM

## 2023-06-15 NOTE — TELEPHONE ENCOUNTER
----- Message from Shahab Luis A sent at 6/15/2023  3:24 PM CDT -----  .Caller is requesting to schedule their Lab appointment prior to annual appointment.  Order is not listed in EPIC.  Please enter order and contact patient to schedule.    Name of Caller:pt    Preferred Date and Time of Labs:    Date of EPP Appointment:06/29/23    Where would they like the lab performed?    Would the patient rather a call back or a response via [facility]? Call back    Best Call Back Number:[phone]    Additional Information: pt would like a call when orders are placed

## 2023-08-15 ENCOUNTER — PATIENT MESSAGE (OUTPATIENT)
Dept: ADMINISTRATIVE | Facility: HOSPITAL | Age: 33
End: 2023-08-15
Payer: COMMERCIAL

## 2023-08-23 ENCOUNTER — PATIENT OUTREACH (OUTPATIENT)
Dept: ADMINISTRATIVE | Facility: HOSPITAL | Age: 33
End: 2023-08-23
Payer: COMMERCIAL

## 2023-08-29 ENCOUNTER — PATIENT MESSAGE (OUTPATIENT)
Dept: FAMILY MEDICINE | Facility: CLINIC | Age: 33
End: 2023-08-29
Payer: COMMERCIAL

## 2023-09-08 ENCOUNTER — LAB VISIT (OUTPATIENT)
Dept: LAB | Facility: HOSPITAL | Age: 33
End: 2023-09-08
Attending: FAMILY MEDICINE
Payer: COMMERCIAL

## 2023-09-08 DIAGNOSIS — Z00.00 WELLNESS EXAMINATION: ICD-10-CM

## 2023-09-08 LAB
ALBUMIN SERPL-MCNC: 3.7 G/DL (ref 3.5–5)
ALBUMIN/GLOB SERPL: 0.9 RATIO (ref 1.1–2)
ALP SERPL-CCNC: 61 UNIT/L (ref 40–150)
ALT SERPL-CCNC: 11 UNIT/L (ref 0–55)
ANISOCYTOSIS BLD QL SMEAR: ABNORMAL
APPEARANCE UR: CLEAR
AST SERPL-CCNC: 14 UNIT/L (ref 5–34)
BASOPHILS # BLD AUTO: 0.06 X10(3)/MCL
BASOPHILS NFR BLD AUTO: 1.3 %
BILIRUB SERPL-MCNC: 0.3 MG/DL
BILIRUB UR QL STRIP.AUTO: NEGATIVE
BUN SERPL-MCNC: 9.2 MG/DL (ref 7–18.7)
CALCIUM SERPL-MCNC: 8.8 MG/DL (ref 8.4–10.2)
CHLORIDE SERPL-SCNC: 108 MMOL/L (ref 98–107)
CHOLEST SERPL-MCNC: 136 MG/DL
CHOLEST/HDLC SERPL: 3 {RATIO} (ref 0–5)
CO2 SERPL-SCNC: 24 MMOL/L (ref 22–29)
COLOR UR: NORMAL
CREAT SERPL-MCNC: 0.86 MG/DL (ref 0.55–1.02)
EOSINOPHIL # BLD AUTO: 0.09 X10(3)/MCL (ref 0–0.9)
EOSINOPHIL NFR BLD AUTO: 1.9 %
ERYTHROCYTE [DISTWIDTH] IN BLOOD BY AUTOMATED COUNT: 21.7 % (ref 11.5–17)
EST. AVERAGE GLUCOSE BLD GHB EST-MCNC: 105.4 MG/DL
GFR SERPLBLD CREATININE-BSD FMLA CKD-EPI: >60 MLS/MIN/1.73/M2
GLOBULIN SER-MCNC: 4 GM/DL (ref 2.4–3.5)
GLUCOSE SERPL-MCNC: 95 MG/DL (ref 74–100)
GLUCOSE UR QL STRIP.AUTO: NEGATIVE
HBA1C MFR BLD: 5.3 %
HCT VFR BLD AUTO: 28.8 % (ref 37–47)
HDLC SERPL-MCNC: 43 MG/DL (ref 35–60)
HGB BLD-MCNC: 8.2 G/DL (ref 12–16)
HYPOCHROMIA BLD QL SMEAR: ABNORMAL
IMM GRANULOCYTES # BLD AUTO: 0 X10(3)/MCL (ref 0–0.04)
IMM GRANULOCYTES NFR BLD AUTO: 0 %
KETONES UR QL STRIP.AUTO: NEGATIVE
LDLC SERPL CALC-MCNC: 81 MG/DL (ref 50–140)
LEUKOCYTE ESTERASE UR QL STRIP.AUTO: NEGATIVE
LYMPHOCYTES # BLD AUTO: 1.92 X10(3)/MCL (ref 0.6–4.6)
LYMPHOCYTES NFR BLD AUTO: 40.8 %
MCH RBC QN AUTO: 18.2 PG (ref 27–31)
MCHC RBC AUTO-ENTMCNC: 28.5 G/DL (ref 33–36)
MCV RBC AUTO: 64 FL (ref 80–94)
MONOCYTES # BLD AUTO: 0.53 X10(3)/MCL (ref 0.1–1.3)
MONOCYTES NFR BLD AUTO: 11.3 %
NEUTROPHILS # BLD AUTO: 2.11 X10(3)/MCL (ref 2.1–9.2)
NEUTROPHILS NFR BLD AUTO: 44.7 %
NITRITE UR QL STRIP.AUTO: NEGATIVE
NRBC BLD AUTO-RTO: 0 %
PH UR STRIP.AUTO: 6 [PH]
PLATELET # BLD AUTO: 422 X10(3)/MCL (ref 130–400)
PLATELET # BLD EST: ADEQUATE 10*3/UL
PMV BLD AUTO: 9.4 FL (ref 7.4–10.4)
POIKILOCYTOSIS BLD QL SMEAR: ABNORMAL
POTASSIUM SERPL-SCNC: 3.8 MMOL/L (ref 3.5–5.1)
PROT SERPL-MCNC: 7.7 GM/DL (ref 6.4–8.3)
PROT UR QL STRIP.AUTO: NEGATIVE
RBC # BLD AUTO: 4.5 X10(6)/MCL (ref 4.2–5.4)
RBC UR QL AUTO: NEGATIVE
SODIUM SERPL-SCNC: 141 MMOL/L (ref 136–145)
SP GR UR STRIP.AUTO: 1.02 (ref 1–1.03)
TRIGL SERPL-MCNC: 58 MG/DL (ref 37–140)
TSH SERPL-ACNC: 1.33 UIU/ML (ref 0.35–4.94)
UROBILINOGEN UR STRIP-ACNC: 0.2
VLDLC SERPL CALC-MCNC: 12 MG/DL
WBC # SPEC AUTO: 4.71 X10(3)/MCL (ref 4.5–11.5)

## 2023-09-08 PROCEDURE — 83036 HEMOGLOBIN GLYCOSYLATED A1C: CPT

## 2023-09-08 PROCEDURE — 81003 URINALYSIS AUTO W/O SCOPE: CPT

## 2023-09-08 PROCEDURE — 80053 COMPREHEN METABOLIC PANEL: CPT

## 2023-09-08 PROCEDURE — 85025 COMPLETE CBC W/AUTO DIFF WBC: CPT

## 2023-09-08 PROCEDURE — 84443 ASSAY THYROID STIM HORMONE: CPT

## 2023-09-08 PROCEDURE — 80061 LIPID PANEL: CPT

## 2023-09-08 PROCEDURE — 36415 COLL VENOUS BLD VENIPUNCTURE: CPT

## 2023-09-11 ENCOUNTER — TELEPHONE (OUTPATIENT)
Dept: FAMILY MEDICINE | Facility: CLINIC | Age: 33
End: 2023-09-11

## 2023-09-11 ENCOUNTER — LAB VISIT (OUTPATIENT)
Dept: LAB | Facility: HOSPITAL | Age: 33
End: 2023-09-11
Attending: FAMILY MEDICINE
Payer: COMMERCIAL

## 2023-09-11 ENCOUNTER — OFFICE VISIT (OUTPATIENT)
Dept: FAMILY MEDICINE | Facility: CLINIC | Age: 33
End: 2023-09-11
Payer: COMMERCIAL

## 2023-09-11 VITALS
DIASTOLIC BLOOD PRESSURE: 74 MMHG | WEIGHT: 186.81 LBS | HEART RATE: 80 BPM | HEIGHT: 66 IN | OXYGEN SATURATION: 97 % | BODY MASS INDEX: 30.02 KG/M2 | RESPIRATION RATE: 17 BRPM | SYSTOLIC BLOOD PRESSURE: 113 MMHG

## 2023-09-11 DIAGNOSIS — D64.9 ANEMIA, UNSPECIFIED TYPE: ICD-10-CM

## 2023-09-11 DIAGNOSIS — F41.1 GENERALIZED ANXIETY DISORDER: ICD-10-CM

## 2023-09-11 DIAGNOSIS — D47.3 ESSENTIAL THROMBOCYTOSIS: ICD-10-CM

## 2023-09-11 DIAGNOSIS — M47.26 OSTEOARTHRITIS OF SPINE WITH RADICULOPATHY, LUMBAR REGION: ICD-10-CM

## 2023-09-11 DIAGNOSIS — M54.16 LUMBAR RADICULOPATHY, CHRONIC: ICD-10-CM

## 2023-09-11 DIAGNOSIS — R77.1 ELEVATED SERUM GLOBULIN LEVEL: ICD-10-CM

## 2023-09-11 DIAGNOSIS — Z00.00 WELLNESS EXAMINATION: Primary | ICD-10-CM

## 2023-09-11 DIAGNOSIS — E66.09 CLASS 1 OBESITY DUE TO EXCESS CALORIES WITHOUT SERIOUS COMORBIDITY WITH BODY MASS INDEX (BMI) OF 30.0 TO 30.9 IN ADULT: ICD-10-CM

## 2023-09-11 LAB
ANISOCYTOSIS BLD QL SMEAR: ABNORMAL
BASOPHILS # BLD AUTO: 0.06 X10(3)/MCL
BASOPHILS NFR BLD AUTO: 1.1 %
CRP SERPL HS-MCNC: 4.04 MG/L
EOSINOPHIL # BLD AUTO: 0.11 X10(3)/MCL (ref 0–0.9)
EOSINOPHIL NFR BLD AUTO: 2.1 %
ERYTHROCYTE [DISTWIDTH] IN BLOOD BY AUTOMATED COUNT: 21.7 % (ref 11.5–17)
HCT VFR BLD AUTO: 30.9 % (ref 37–47)
HGB BLD-MCNC: 8.7 G/DL (ref 12–16)
HYPOCHROMIA BLD QL SMEAR: ABNORMAL
IMM GRANULOCYTES # BLD AUTO: 0.01 X10(3)/MCL (ref 0–0.04)
IMM GRANULOCYTES NFR BLD AUTO: 0.2 %
IRON SATN MFR SERPL: 10 % (ref 20–50)
IRON SERPL-MCNC: 39 UG/DL (ref 50–170)
LYMPHOCYTES # BLD AUTO: 2.45 X10(3)/MCL (ref 0.6–4.6)
LYMPHOCYTES NFR BLD AUTO: 45.8 %
MCH RBC QN AUTO: 18.2 PG (ref 27–31)
MCHC RBC AUTO-ENTMCNC: 28.2 G/DL (ref 33–36)
MCV RBC AUTO: 64.6 FL (ref 80–94)
MICROCYTES BLD QL SMEAR: ABNORMAL
MONOCYTES # BLD AUTO: 0.54 X10(3)/MCL (ref 0.1–1.3)
MONOCYTES NFR BLD AUTO: 10.1 %
NEUTROPHILS # BLD AUTO: 2.18 X10(3)/MCL (ref 2.1–9.2)
NEUTROPHILS NFR BLD AUTO: 40.7 %
NRBC BLD AUTO-RTO: 0 %
PLATELET # BLD AUTO: 412 X10(3)/MCL (ref 130–400)
PLATELET # BLD EST: ABNORMAL 10*3/UL
PMV BLD AUTO: 9 FL (ref 7.4–10.4)
POIKILOCYTOSIS BLD QL SMEAR: ABNORMAL
RBC # BLD AUTO: 4.78 X10(6)/MCL (ref 4.2–5.4)
RBC MORPH BLD: ABNORMAL
TIBC SERPL-MCNC: 359 UG/DL (ref 70–310)
TIBC SERPL-MCNC: 398 UG/DL (ref 250–450)
TRANSFERRIN SERPL-MCNC: 355 MG/DL (ref 180–382)
VIT B12 SERPL-MCNC: 427 PG/ML (ref 213–816)
WBC # SPEC AUTO: 5.35 X10(3)/MCL (ref 4.5–11.5)

## 2023-09-11 PROCEDURE — 86141 C-REACTIVE PROTEIN HS: CPT

## 2023-09-11 PROCEDURE — 82607 VITAMIN B-12: CPT

## 2023-09-11 PROCEDURE — 3044F PR MOST RECENT HEMOGLOBIN A1C LEVEL <7.0%: ICD-10-PCS | Mod: CPTII,,, | Performed by: FAMILY MEDICINE

## 2023-09-11 PROCEDURE — 85025 COMPLETE CBC W/AUTO DIFF WBC: CPT

## 2023-09-11 PROCEDURE — 82664 ELECTROPHORETIC TEST: CPT

## 2023-09-11 PROCEDURE — 99395 PR PREVENTIVE VISIT,EST,18-39: ICD-10-PCS | Mod: ,,, | Performed by: FAMILY MEDICINE

## 2023-09-11 PROCEDURE — 1160F RVW MEDS BY RX/DR IN RCRD: CPT | Mod: CPTII,,, | Performed by: FAMILY MEDICINE

## 2023-09-11 PROCEDURE — 3078F PR MOST RECENT DIASTOLIC BLOOD PRESSURE < 80 MM HG: ICD-10-PCS | Mod: CPTII,,, | Performed by: FAMILY MEDICINE

## 2023-09-11 PROCEDURE — 1159F MED LIST DOCD IN RCRD: CPT | Mod: CPTII,,, | Performed by: FAMILY MEDICINE

## 2023-09-11 PROCEDURE — 86200 CCP ANTIBODY: CPT

## 2023-09-11 PROCEDURE — 83789 MASS SPECTROMETRY QUAL/QUAN: CPT

## 2023-09-11 PROCEDURE — 84165 PROTEIN E-PHORESIS SERUM: CPT

## 2023-09-11 PROCEDURE — 83068 HEMOGLOBIN UNSTABLE SCREEN: CPT

## 2023-09-11 PROCEDURE — 81364 HBB FULL GENE SEQUENCE: CPT

## 2023-09-11 PROCEDURE — 81269 HBA1/HBA2 GENE DUP/DEL VRNTS: CPT

## 2023-09-11 PROCEDURE — 1159F PR MEDICATION LIST DOCUMENTED IN MEDICAL RECORD: ICD-10-PCS | Mod: CPTII,,, | Performed by: FAMILY MEDICINE

## 2023-09-11 PROCEDURE — 83020 HEMOGLOBIN ELECTROPHORESIS: CPT | Mod: 91

## 2023-09-11 PROCEDURE — 3074F PR MOST RECENT SYSTOLIC BLOOD PRESSURE < 130 MM HG: ICD-10-PCS | Mod: CPTII,,, | Performed by: FAMILY MEDICINE

## 2023-09-11 PROCEDURE — 3044F HG A1C LEVEL LT 7.0%: CPT | Mod: CPTII,,, | Performed by: FAMILY MEDICINE

## 2023-09-11 PROCEDURE — 86225 DNA ANTIBODY NATIVE: CPT

## 2023-09-11 PROCEDURE — 99395 PREV VISIT EST AGE 18-39: CPT | Mod: ,,, | Performed by: FAMILY MEDICINE

## 2023-09-11 PROCEDURE — 83020 HEMOGLOBIN ELECTROPHORESIS: CPT

## 2023-09-11 PROCEDURE — 99214 OFFICE O/P EST MOD 30 MIN: CPT | Mod: 25,,, | Performed by: FAMILY MEDICINE

## 2023-09-11 PROCEDURE — 3008F BODY MASS INDEX DOCD: CPT | Mod: CPTII,,, | Performed by: FAMILY MEDICINE

## 2023-09-11 PROCEDURE — 3008F PR BODY MASS INDEX (BMI) DOCUMENTED: ICD-10-PCS | Mod: CPTII,,, | Performed by: FAMILY MEDICINE

## 2023-09-11 PROCEDURE — 99214 PR OFFICE/OUTPT VISIT, EST, LEVL IV, 30-39 MIN: ICD-10-PCS | Mod: 25,,, | Performed by: FAMILY MEDICINE

## 2023-09-11 PROCEDURE — 3074F SYST BP LT 130 MM HG: CPT | Mod: CPTII,,, | Performed by: FAMILY MEDICINE

## 2023-09-11 PROCEDURE — 3078F DIAST BP <80 MM HG: CPT | Mod: CPTII,,, | Performed by: FAMILY MEDICINE

## 2023-09-11 PROCEDURE — 83540 ASSAY OF IRON: CPT

## 2023-09-11 PROCEDURE — 86235 NUCLEAR ANTIGEN ANTIBODY: CPT

## 2023-09-11 PROCEDURE — 86812 HLA TYPING A B OR C: CPT

## 2023-09-11 PROCEDURE — 36415 COLL VENOUS BLD VENIPUNCTURE: CPT

## 2023-09-11 PROCEDURE — 1160F PR REVIEW ALL MEDS BY PRESCRIBER/CLIN PHARMACIST DOCUMENTED: ICD-10-PCS | Mod: CPTII,,, | Performed by: FAMILY MEDICINE

## 2023-09-11 RX ORDER — ALPRAZOLAM 0.25 MG/1
0.25 TABLET ORAL DAILY PRN
Qty: 15 TABLET | Refills: 1 | Status: SHIPPED | OUTPATIENT
Start: 2023-09-11

## 2023-09-11 NOTE — PATIENT INSTRUCTIONS
Jim Champion,     If you are due for any health screening(s) below please notify me so we can arrange them to be ordered and scheduled. Most healthy patients at your age complete them, but you are free to accept or refuse.     If you can't do it, I'll definitely understand. If you can, I'd certainly appreciate it!    All of your core healthy metrics are met.

## 2023-09-11 NOTE — TELEPHONE ENCOUNTER
----- Message from Janice Stevenson MD sent at 9/11/2023  2:08 PM CDT -----  Iron level is low, 39, normal: , needs iron rich diet and OTC iron supplement as directed, still awaiting pending hemoglobin electrophoresis and thalassemia panel for further recommendations. Vitamin B12 level is normal.

## 2023-09-11 NOTE — LETTER
September 11, 2023      [facility]  [address]  [address]  [place] [address]  Phone: [phone]       Patient: Jaycee Marie   YOB: 1990  Date of Visit: 09/11/2023    To Whom It May Concern:    Susie Marie  was at [facility] on 09/11/2023. The patient may return to work/school on 09/12/2023 with no restrictions. If you have any questions or concerns, or if I can be of further assistance, please do not hesitate to contact me.    Sincerely,    Clary Cisneros LPN

## 2023-09-11 NOTE — TELEPHONE ENCOUNTER
----- Message from Janice Stevenson MD sent at 9/11/2023 10:07 AM CDT -----  Patient is still anemic, but improving, still awaiting remaining lab results for further recommendation. CRP (inflammatory test) is normal.
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
## 2023-09-12 ENCOUNTER — TELEPHONE (OUTPATIENT)
Dept: FAMILY MEDICINE | Facility: CLINIC | Age: 33
End: 2023-09-12
Payer: COMMERCIAL

## 2023-09-12 ENCOUNTER — DOCUMENTATION ONLY (OUTPATIENT)
Dept: FAMILY MEDICINE | Facility: CLINIC | Age: 33
End: 2023-09-12
Payer: COMMERCIAL

## 2023-09-12 LAB
ALBUMIN % SPEP (OHS): 46.86 (ref 48.1–59.5)
ALBUMIN SERPL BCP-MCNC: 3.8 G/DL
ALBUMIN/GLOB SERPL: 0.9 RATIO
ALPHA 1 GLOB (OHS): 0.28 GM/DL (ref 0–0.4)
ALPHA 1 GLOB% (OHS): 3.42 (ref 2.3–4.9)
ALPHA 2 GLOB % (OHS): 8.55 (ref 6.9–13)
ALPHA 2 GLOB (OHS): 0.69 GM/DL (ref 0.4–1)
ANTINUCLEAR ANTIBODY SCREEN (OHS): NEGATIVE
BETA GLOB (OHS): 1.37 GM/DL (ref 0.7–1.3)
BETA GLOB% (OHS): 16.92 (ref 13.8–19.7)
CENTROMERE QUANT (OHS): <0.4 U/ML
CYCLIC CITRULLINATED PEPTIDE (CCP) (OHS): 0.5 U/ML
DSDNA AB QUANT (OHS): <0.6 IU/ML
GAMMA GLOBULIN % (OHS): 24.25 (ref 10.1–21.9)
GAMMA GLOBULIN (OHS): 1.96 GM/DL (ref 0.4–1.8)
GLOBULIN SER-MCNC: 4.3 GM/DL
HLA TYP COMM-IMP: NORMAL
HLA-B27 QL FC: NEGATIVE
JO-1 AB QUANT (OHS): <0.3 U/ML
M SPIKE % (OHS): ABNORMAL
M SPIKE (OHS): ABNORMAL
PATH REV: NORMAL
PROT SERPL-MCNC: 8.1 GM/DL (ref 6.4–8.3)
RNP70 AB QUANT (OHS): <0.3 U/ML
SCL-70S AB QUANT (OHS): <0.6 U/ML
SMITH AB QUANT (OHS): 1.6 U/ML
SSA(RO) AB QUANT (OHS): <0.4 U/ML
SSB(LA) AB QUANT (OHS): <0.4 U/ML
U1RNP AB QUANT (OHS): 0.6 U/ML

## 2023-09-12 NOTE — TELEPHONE ENCOUNTER
----- Message from Janice Stevenson MD sent at 9/12/2023  4:40 PM CDT -----  Peripheral smear is negative for leukemia.

## 2023-09-13 ENCOUNTER — TELEPHONE (OUTPATIENT)
Dept: FAMILY MEDICINE | Facility: CLINIC | Age: 33
End: 2023-09-13
Payer: COMMERCIAL

## 2023-09-13 LAB — VIEW PATHOLOGY REPORT (RELIAPATH): NORMAL

## 2023-09-13 NOTE — TELEPHONE ENCOUNTER
----- Message from Janice Stevenson MD sent at 9/12/2023  4:59 PM CDT -----  Peripheral smear is negative for M spike. HLA-B27  is negative. JULIA (auto-immune disease) panel is negative for auto-immune disease.

## 2023-09-15 LAB — HGB UNSTAB RBC QL: NORMAL

## 2023-09-18 ENCOUNTER — TELEPHONE (OUTPATIENT)
Dept: FAMILY MEDICINE | Facility: CLINIC | Age: 33
End: 2023-09-18
Payer: COMMERCIAL

## 2023-09-18 NOTE — TELEPHONE ENCOUNTER
----- Message from Janice Stevenson MD sent at 9/15/2023  2:01 PM CDT -----  Hemoglobin B testing is normal.

## 2023-09-19 LAB — HGB FRACT BLD ELPH-IMP: NORMAL

## 2023-09-20 ENCOUNTER — PATIENT MESSAGE (OUTPATIENT)
Dept: FAMILY MEDICINE | Facility: CLINIC | Age: 33
End: 2023-09-20
Payer: COMMERCIAL

## 2023-09-21 LAB — HGB OTHER BLD: NORMAL

## 2023-09-22 LAB
HGB A MFR BLD ELPH: 95.3 % (ref 95.8–98)
HGB A2 MFR BLD ELPH: 2.5 % (ref 2–3.3)
HGB F MFR BLD ELPH: 0.4 % (ref 0–0.9)
HGB FRACT BLD ELPH-IMP: ABNORMAL
HGB XXX MFR BLD ELPH: ABNORMAL %
M HPLC HB VARIANT, B: ABNORMAL

## 2023-09-27 DIAGNOSIS — D56.3 BETA THALASSEMIA MINOR: Primary | ICD-10-CM

## 2023-09-27 LAB
HEMATOLOGIST REVIEW: NORMAL
MOL DX INTERP BLD/T QL: NORMAL

## 2023-09-28 ENCOUNTER — TELEPHONE (OUTPATIENT)
Dept: FAMILY MEDICINE | Facility: CLINIC | Age: 33
End: 2023-09-28
Payer: COMMERCIAL

## 2023-09-28 DIAGNOSIS — M54.16 LUMBAR RADICULOPATHY, CHRONIC: Primary | ICD-10-CM

## 2023-09-28 LAB — MAYO GENERIC ORDERABLE RESULT: NORMAL

## 2023-09-28 RX ORDER — GABAPENTIN 100 MG/1
100 CAPSULE ORAL 3 TIMES DAILY
Qty: 90 CAPSULE | Refills: 2 | Status: SHIPPED | OUTPATIENT
Start: 2023-09-28

## 2023-09-28 RX ORDER — DICLOFENAC SODIUM 75 MG/1
75 TABLET, DELAYED RELEASE ORAL 2 TIMES DAILY PRN
Qty: 60 TABLET | Refills: 2 | Status: SHIPPED | OUTPATIENT
Start: 2023-09-28

## 2023-09-28 NOTE — TELEPHONE ENCOUNTER
----- Message from Janice Stevenson MD sent at 9/28/2023  1:11 PM CDT -----  Testing is negative for alpha thalassemia.

## 2023-09-28 NOTE — TELEPHONE ENCOUNTER
Pt is in a lot of pain. She's been taking Ibuprofen and its not helping as much. She voiced she will take a referral to pain management. Pt is asking is there anything you can give her now until she goes to pain management.

## 2023-09-28 NOTE — TELEPHONE ENCOUNTER
----- Message from April Stewart sent at 9/28/2023 11:55 AM CDT -----  Regarding: med advice  .Type:  Needs Medical Advice    Who Called: patient  Symptoms (please be specific):    How long has patient had these symptoms:    Pharmacy name and phone #:    Would the patient rather a call back or a response via [facility]? Call back  Best Call Back Number:  [phone]  Additional Information: patient would like a call back regarding questions concerning treatments. Please advise.

## 2023-10-03 LAB
HGB FRACT BLD ELPH-IMP: NORMAL
PROVIDER SIGNING NAME: NORMAL

## 2023-10-06 ENCOUNTER — TELEPHONE (OUTPATIENT)
Dept: FAMILY MEDICINE | Facility: CLINIC | Age: 33
End: 2023-10-06
Payer: COMMERCIAL

## 2023-10-06 DIAGNOSIS — D56.3 BETA THALASSEMIA MINOR: Primary | ICD-10-CM

## 2023-10-06 NOTE — TELEPHONE ENCOUNTER
----- Message from Tabitha Ziegler MD sent at 10/6/2023 10:46 AM CDT -----  Please inform patient of lab results.    1. Patient is heterozygous beta Thalassemia mutation positive. This beta plus thalassemia alteration is typically associated with mild to minimal anemia (HGB   10.5-13 g/dL) and microcytosis (MCV 68-80 fL) in heterozygous individuals.     2. These results may have relevance for patient's relatives or descendants. Beta thalassemia mutations pose some reproductive risk for offspring, depending on the   genotype of the other parent, because they can cause a clinically significant condition if co-inherited with another beta thalassemia mutation, with Hb S, Hb C, Hb   C-Joseph, O-[place] or Hb E, or with an alpha globin gene triplication. A genetic consultation may be of benefit.     3. Referral to Hematology sent for further discussion.  It is mild and usually asymptomatic. Will continue to monitor for now.    Thanks,    Dr. Ziegler

## 2023-10-06 NOTE — PROGRESS NOTES
Please inform patient of lab results.    1. Patient is heterozygous beta Thalassemia mutation positive. This beta plus thalassemia alteration is typically associated with mild to minimal anemia (HGB   10.5-13 g/dL) and microcytosis (MCV 68-80 fL) in heterozygous individuals.     2. These results may have relevance for patient's relatives or descendants. Beta thalassemia mutations pose some reproductive risk for offspring, depending on the   genotype of the other parent, because they can cause a clinically significant condition if co-inherited with another beta thalassemia mutation, with Hb S, Hb C, Hb   C-Joseph, O-[place] or Hb E, or with an alpha globin gene triplication. A genetic consultation may be of benefit.     3. Referral to Hematology sent for further discussion.  It is mild and usually asymptomatic. Will continue to monitor for now.    Thanks,    Dr. Ziegler

## 2023-10-18 ENCOUNTER — TELEPHONE (OUTPATIENT)
Dept: FAMILY MEDICINE | Facility: CLINIC | Age: 33
End: 2023-10-18
Payer: COMMERCIAL

## 2023-10-18 NOTE — TELEPHONE ENCOUNTER
"Spoke with Dr Murillo's office about pts pain management referral. Rosa states "I called the patient and she is not really wanting injections, she would like for Dr. Stevenson to refer her to someone that does medication management" Please advise Thanks  "

## 2023-10-18 NOTE — TELEPHONE ENCOUNTER
Pt will contact her insurance to see who they cover for pain med management . She will call us back later this week

## 2023-10-23 ENCOUNTER — OFFICE VISIT (OUTPATIENT)
Dept: HEMATOLOGY/ONCOLOGY | Facility: CLINIC | Age: 33
End: 2023-10-23
Payer: COMMERCIAL

## 2023-10-23 VITALS
HEIGHT: 66 IN | BODY MASS INDEX: 30.58 KG/M2 | TEMPERATURE: 98 F | RESPIRATION RATE: 19 BRPM | WEIGHT: 190.31 LBS | SYSTOLIC BLOOD PRESSURE: 110 MMHG | DIASTOLIC BLOOD PRESSURE: 74 MMHG | OXYGEN SATURATION: 100 % | HEART RATE: 71 BPM

## 2023-10-23 DIAGNOSIS — D56.3 BETA THALASSEMIA MINOR: Primary | ICD-10-CM

## 2023-10-23 DIAGNOSIS — D50.9 IRON DEFICIENCY ANEMIA, UNSPECIFIED IRON DEFICIENCY ANEMIA TYPE: Chronic | ICD-10-CM

## 2023-10-23 LAB
BASOPHILS # BLD AUTO: 0.04 X10(3)/MCL
BASOPHILS NFR BLD AUTO: 0.5 %
EOSINOPHIL # BLD AUTO: 0.1 X10(3)/MCL (ref 0–0.9)
EOSINOPHIL NFR BLD AUTO: 1.4 %
ERYTHROCYTE [DISTWIDTH] IN BLOOD BY AUTOMATED COUNT: 21.7 % (ref 11.5–17)
FERRITIN SERPL-MCNC: 5.11 NG/ML (ref 4.63–204)
HCT VFR BLD AUTO: 30.8 % (ref 37–47)
HGB BLD-MCNC: 8.6 G/DL (ref 12–16)
IMM GRANULOCYTES # BLD AUTO: 0.01 X10(3)/MCL (ref 0–0.04)
IMM GRANULOCYTES NFR BLD AUTO: 0.1 %
IRON SATN MFR SERPL: 6 % (ref 20–50)
IRON SERPL-MCNC: 21 UG/DL (ref 50–170)
LYMPHOCYTES # BLD AUTO: 2.72 X10(3)/MCL (ref 0.6–4.6)
LYMPHOCYTES NFR BLD AUTO: 37 %
MCH RBC QN AUTO: 18.7 PG (ref 27–31)
MCHC RBC AUTO-ENTMCNC: 27.9 G/DL (ref 33–36)
MCV RBC AUTO: 66.8 FL (ref 80–94)
MONOCYTES # BLD AUTO: 1.01 X10(3)/MCL (ref 0.1–1.3)
MONOCYTES NFR BLD AUTO: 13.7 %
NEUTROPHILS # BLD AUTO: 3.47 X10(3)/MCL (ref 2.1–9.2)
NEUTROPHILS NFR BLD AUTO: 47.3 %
PLATELET # BLD AUTO: 355 X10(3)/MCL (ref 130–400)
PMV BLD AUTO: 9.3 FL (ref 7.4–10.4)
RBC # BLD AUTO: 4.61 X10(6)/MCL (ref 4.2–5.4)
TIBC SERPL-MCNC: 333 UG/DL (ref 70–310)
TIBC SERPL-MCNC: 354 UG/DL (ref 250–450)
TRANSFERRIN SERPL-MCNC: 320 MG/DL (ref 180–382)
WBC # SPEC AUTO: 7.35 X10(3)/MCL (ref 4.5–11.5)

## 2023-10-23 PROCEDURE — 1159F PR MEDICATION LIST DOCUMENTED IN MEDICAL RECORD: ICD-10-PCS | Mod: CPTII,S$GLB,, | Performed by: INTERNAL MEDICINE

## 2023-10-23 PROCEDURE — 3044F PR MOST RECENT HEMOGLOBIN A1C LEVEL <7.0%: ICD-10-PCS | Mod: CPTII,S$GLB,, | Performed by: INTERNAL MEDICINE

## 2023-10-23 PROCEDURE — 3078F DIAST BP <80 MM HG: CPT | Mod: CPTII,S$GLB,, | Performed by: INTERNAL MEDICINE

## 2023-10-23 PROCEDURE — 82728 ASSAY OF FERRITIN: CPT | Performed by: INTERNAL MEDICINE

## 2023-10-23 PROCEDURE — 3074F PR MOST RECENT SYSTOLIC BLOOD PRESSURE < 130 MM HG: ICD-10-PCS | Mod: CPTII,S$GLB,, | Performed by: INTERNAL MEDICINE

## 2023-10-23 PROCEDURE — 99999 PR PBB SHADOW E&M-EST. PATIENT-LVL IV: ICD-10-PCS | Mod: PBBFAC,,, | Performed by: INTERNAL MEDICINE

## 2023-10-23 PROCEDURE — 3044F HG A1C LEVEL LT 7.0%: CPT | Mod: CPTII,S$GLB,, | Performed by: INTERNAL MEDICINE

## 2023-10-23 PROCEDURE — 3008F PR BODY MASS INDEX (BMI) DOCUMENTED: ICD-10-PCS | Mod: CPTII,S$GLB,, | Performed by: INTERNAL MEDICINE

## 2023-10-23 PROCEDURE — 1160F PR REVIEW ALL MEDS BY PRESCRIBER/CLIN PHARMACIST DOCUMENTED: ICD-10-PCS | Mod: CPTII,S$GLB,, | Performed by: INTERNAL MEDICINE

## 2023-10-23 PROCEDURE — 1160F RVW MEDS BY RX/DR IN RCRD: CPT | Mod: CPTII,S$GLB,, | Performed by: INTERNAL MEDICINE

## 2023-10-23 PROCEDURE — 3008F BODY MASS INDEX DOCD: CPT | Mod: CPTII,S$GLB,, | Performed by: INTERNAL MEDICINE

## 2023-10-23 PROCEDURE — 1159F MED LIST DOCD IN RCRD: CPT | Mod: CPTII,S$GLB,, | Performed by: INTERNAL MEDICINE

## 2023-10-23 PROCEDURE — 3074F SYST BP LT 130 MM HG: CPT | Mod: CPTII,S$GLB,, | Performed by: INTERNAL MEDICINE

## 2023-10-23 PROCEDURE — 99203 PR OFFICE/OUTPT VISIT, NEW, LEVL III, 30-44 MIN: ICD-10-PCS | Mod: S$GLB,,, | Performed by: INTERNAL MEDICINE

## 2023-10-23 PROCEDURE — 3078F PR MOST RECENT DIASTOLIC BLOOD PRESSURE < 80 MM HG: ICD-10-PCS | Mod: CPTII,S$GLB,, | Performed by: INTERNAL MEDICINE

## 2023-10-23 PROCEDURE — 99999 PR PBB SHADOW E&M-EST. PATIENT-LVL IV: CPT | Mod: PBBFAC,,, | Performed by: INTERNAL MEDICINE

## 2023-10-23 PROCEDURE — 85025 COMPLETE CBC W/AUTO DIFF WBC: CPT | Performed by: INTERNAL MEDICINE

## 2023-10-23 PROCEDURE — 99203 OFFICE O/P NEW LOW 30 MIN: CPT | Mod: S$GLB,,, | Performed by: INTERNAL MEDICINE

## 2023-10-23 PROCEDURE — 36415 COLL VENOUS BLD VENIPUNCTURE: CPT | Performed by: INTERNAL MEDICINE

## 2023-10-23 PROCEDURE — 83540 ASSAY OF IRON: CPT | Performed by: INTERNAL MEDICINE

## 2023-10-23 NOTE — PROGRESS NOTES
HEMATOLOGY/ONCOLOGY OFFICE CLINIC VISIT    Visit Information:    Initial Evaluation: 10/23/2023  Referring Provider:  Dr Dumont  Other providers:  Code status: Not addressed    Diagnosis:  Beta Thalassemia minor  Iron deficiency anemia    Present treatment:    Treatment/Oncology history:  Oral iron    Plan of care:     Imaging:    Pathology:      CLINICAL HISTORY:       Patient: Jaycee Marie is a 33 y.o. female kindly referred for beta thalassemia minor.    Patient reports that she had several relative from her father's side with beta thalassemia minor but she was just recently diagnosed.  Patient is also more anemic than her baseline and iron deficient.  She reports that her menstrual periods can be very heavy at times or last and asymmetry in 5-7 days.  Patient has been on oral iron off and on.  She has never had blood transfusion.    She is here today with her fiance.  She has 3 children ranging age from 11 to 17. Her daughter has beta thalassemia trait.  Patient reports PICA and tiredness.  She also have chronic joint pain in her right shoulder and knee and lower back.  She was referred to the pain management doctor by her primary care physician.    Chief Complaint: OTHER (NP referred by Tabitha Barahona for Beta Thalassemia Minor.)      Interval History:        Past Medical History:   Diagnosis Date    History of tubal ligation 9/19/2013    Iron deficiency anemia 5/17/2022    Low back pain 5/17/2022    Obesity 5/17/2022    Prediabetes 5/17/2022    Right knee pain 5/17/2022    Right shoulder pain 5/17/2022    Scoliosis 5/17/2022      Past Surgical History:   Procedure Laterality Date    TONSILLECTOMY N/A     TUBAL LIGATION N/A      Family History   Problem Relation Age of Onset    Epilepsy Father      Social Connections: Not on file       Review of patient's allergies indicates:  No Known Allergies   Current Outpatient Medications on File Prior to Visit   Medication Sig Dispense Refill    ALPRAZolam  "(XANAX) 0.25 MG tablet Take 1 tablet (0.25 mg total) by mouth daily as needed for Anxiety. 15 tablet 1    diclofenac (VOLTAREN) 75 MG EC tablet Take 1 tablet (75 mg total) by mouth 2 (two) times daily as needed (pain/inflammatino). 60 tablet 2    ferrous gluconate (FERGON) 324 MG tablet Take 1 tablet by mouth 2 (two) times daily with meals.      gabapentin (NEURONTIN) 100 MG capsule Take 1 capsule (100 mg total) by mouth 3 (three) times daily. 90 capsule 2    levocetirizine (XYZAL) 5 MG tablet Take 5 mg by mouth nightly as needed.       No current facility-administered medications on file prior to visit.      Review of Systems   Constitutional:  Positive for fatigue. Negative for activity change, appetite change, chills, fever and unexpected weight change.   HENT:  Negative for mouth dryness, mouth sores, nosebleeds, sore throat and trouble swallowing.    Eyes:  Negative for visual disturbance.   Respiratory:  Negative for cough and shortness of breath.    Cardiovascular:  Negative for chest pain, palpitations and leg swelling.   Gastrointestinal:  Negative for abdominal distention, abdominal pain, blood in stool, change in bowel habit, constipation, diarrhea, nausea and vomiting.   Endocrine: Negative.    Genitourinary:  Negative for dysuria, frequency, hematuria and urgency.   Musculoskeletal:  Positive for arthralgias. Negative for back pain, myalgias and neck pain.   Integumentary:  Negative for rash.   Neurological:  Negative for dizziness, tremors, syncope, speech difficulty, weakness, light-headedness, numbness, headaches and memory loss.   Hematological:  Does not bruise/bleed easily.   Psychiatric/Behavioral:  Negative for confusion and suicidal ideas.               Vitals:    10/23/23 1322   BP: 110/74   BP Location: Left arm   Patient Position: Sitting   Pulse: 71   Resp: 19   Temp: 98.2 °F (36.8 °C)   TempSrc: Oral   SpO2: 100%   Weight: 86.3 kg (190 lb 4.8 oz)   Height: 5' 6" (1.676 m)      Wt Readings " from Last 6 Encounters:   10/23/23 86.3 kg (190 lb 4.8 oz)   09/11/23 84.7 kg (186 lb 12.8 oz)   05/17/22 91 kg (200 lb 9.6 oz)   01/26/22 89.7 kg (197 lb 12 oz)   06/06/18 88.3 kg (194 lb 10.7 oz)     Body mass index is 30.72 kg/m².  Body surface area is 2 meters squared.  Physical Exam  Vitals and nursing note reviewed.   Constitutional:       General: She is not in acute distress.     Appearance: Normal appearance. She is well-developed.   HENT:      Head: Normocephalic and atraumatic.      Mouth/Throat:      Mouth: Mucous membranes are moist.   Eyes:      General: No scleral icterus.     Extraocular Movements: Extraocular movements intact.      Conjunctiva/sclera: Conjunctivae normal.      Pupils: Pupils are equal, round, and reactive to light.   Neck:      Vascular: No JVD.   Cardiovascular:      Rate and Rhythm: Normal rate and regular rhythm.      Heart sounds: No murmur heard.  Pulmonary:      Effort: Pulmonary effort is normal.      Breath sounds: Normal breath sounds. No wheezing or rhonchi.   Abdominal:      General: Bowel sounds are normal. There is no distension.      Palpations: Abdomen is soft. There is no mass.      Tenderness: There is no abdominal tenderness.   Musculoskeletal:         General: No swelling or deformity.      Cervical back: Neck supple.   Lymphadenopathy:      Head:      Right side of head: No submandibular adenopathy.      Left side of head: No submandibular adenopathy.      Cervical: No cervical adenopathy.      Upper Body:      Right upper body: No supraclavicular or axillary adenopathy.      Left upper body: No supraclavicular or axillary adenopathy.      Lower Body: No right inguinal adenopathy. No left inguinal adenopathy.   Skin:     General: Skin is warm.      Coloration: Skin is not jaundiced.      Findings: No lesion or rash.      Nails: There is no clubbing.   Neurological:      General: No focal deficit present.      Mental Status: She is alert and oriented to person,  place, and time.      Cranial Nerves: Cranial nerves 2-12 are intact.   Psychiatric:         Attention and Perception: Attention normal.         Behavior: Behavior is cooperative.         Judgment: Judgment normal.         Laboratory:  CBC with Differential:  Lab Results   Component Value Date    WBC 5.35 09/11/2023    RBC 4.78 09/11/2023    HGB 8.7 (L) 09/11/2023    HCT 30.9 (L) 09/11/2023    MCV 64.6 (L) 09/11/2023    MCH 18.2 (L) 09/11/2023    MCHC 28.2 (L) 09/11/2023    RDW 21.7 (H) 09/11/2023     (H) 09/11/2023    MPV 9.0 09/11/2023        CMP:  Sodium Level   Date Value Ref Range Status   09/08/2023 141 136 - 145 mmol/L Final     Potassium Level   Date Value Ref Range Status   09/08/2023 3.8 3.5 - 5.1 mmol/L Final     Carbon Dioxide   Date Value Ref Range Status   09/08/2023 24 22 - 29 mmol/L Final     Blood Urea Nitrogen   Date Value Ref Range Status   09/08/2023 9.2 7.0 - 18.7 mg/dL Final     Creatinine   Date Value Ref Range Status   09/08/2023 0.86 0.55 - 1.02 mg/dL Final     Calcium Level Total   Date Value Ref Range Status   09/08/2023 8.8 8.4 - 10.2 mg/dL Final     Albumin   Date Value Ref Range Status   09/11/2023 3.8 g/dL Final     Bilirubin Total   Date Value Ref Range Status   09/08/2023 0.3 <=1.5 mg/dL Final     Alkaline Phosphatase   Date Value Ref Range Status   09/08/2023 61 40 - 150 unit/L Final     Aspartate Aminotransferase   Date Value Ref Range Status   09/08/2023 14 5 - 34 unit/L Final     Alanine Aminotransferase   Date Value Ref Range Status   09/08/2023 11 0 - 55 unit/L Final     Estimated GFR-Non    Date Value Ref Range Status   01/20/2022 >60 mL/min/1.73 m2 Final             Assessment:       1. Beta thalassemia minor    2. Iron deficiency anemia, unspecified iron deficiency anemia type      Explained to the patient that Beta-thalassemia minor or trait, is the heterozygous state that is usually asymptomatic with mild anemia.  This is inherited.  Most of the time  this patient is do not need any type of treatment at all.  Patient is GI and is having menstrual cycles that is the reason of her iron deficiency.  Her most recent hemoglobin is lower than her baseline.  We will check iron levels today I discussed with her different ways to replace iron either by mouth with iron pills versus IV.          Plan:         CBC, iron profile and ferritin today  Will call with the results at which time she will let me know if she would be okay to IV iron versus oral iron.  RTC in 3 months with labs  Labs: CBC, iron profile and ferritin    The patient was seen, interviewed and examined. Pertinent lab and radiology studies were reviewed.   The patient was given ample opportunity to ask questions, and to the best of my abilities, all questions answered to satisfaction; patient demonstrated understanding of what we discussed and agreeable to the plan. Pt instructed to call should develop concerning signs/symptoms or have further questions.     I'd like to thank for referring and allowing me the opportunity to participate in the care of this patient and if any questions, please do not hesitate to call the office at [phone].           Emperatriz Worrell MD  Hematology/Oncology

## 2023-11-30 ENCOUNTER — PATIENT MESSAGE (OUTPATIENT)
Dept: FAMILY MEDICINE | Facility: CLINIC | Age: 33
End: 2023-11-30
Payer: COMMERCIAL

## 2023-12-13 ENCOUNTER — OFFICE VISIT (OUTPATIENT)
Dept: FAMILY MEDICINE | Facility: CLINIC | Age: 33
End: 2023-12-13
Payer: COMMERCIAL

## 2023-12-13 VITALS
SYSTOLIC BLOOD PRESSURE: 107 MMHG | TEMPERATURE: 98 F | DIASTOLIC BLOOD PRESSURE: 72 MMHG | HEIGHT: 66 IN | BODY MASS INDEX: 31.21 KG/M2 | WEIGHT: 194.19 LBS | HEART RATE: 64 BPM | OXYGEN SATURATION: 97 % | RESPIRATION RATE: 20 BRPM

## 2023-12-13 DIAGNOSIS — F41.1 GENERALIZED ANXIETY DISORDER: Primary | ICD-10-CM

## 2023-12-13 PROCEDURE — 3078F DIAST BP <80 MM HG: CPT | Mod: CPTII,,, | Performed by: FAMILY MEDICINE

## 2023-12-13 PROCEDURE — 3074F SYST BP LT 130 MM HG: CPT | Mod: CPTII,,, | Performed by: FAMILY MEDICINE

## 2023-12-13 PROCEDURE — 1160F PR REVIEW ALL MEDS BY PRESCRIBER/CLIN PHARMACIST DOCUMENTED: ICD-10-PCS | Mod: CPTII,,, | Performed by: FAMILY MEDICINE

## 2023-12-13 PROCEDURE — 3008F BODY MASS INDEX DOCD: CPT | Mod: CPTII,,, | Performed by: FAMILY MEDICINE

## 2023-12-13 PROCEDURE — 1159F PR MEDICATION LIST DOCUMENTED IN MEDICAL RECORD: ICD-10-PCS | Mod: CPTII,,, | Performed by: FAMILY MEDICINE

## 2023-12-13 PROCEDURE — 3074F PR MOST RECENT SYSTOLIC BLOOD PRESSURE < 130 MM HG: ICD-10-PCS | Mod: CPTII,,, | Performed by: FAMILY MEDICINE

## 2023-12-13 PROCEDURE — 3044F PR MOST RECENT HEMOGLOBIN A1C LEVEL <7.0%: ICD-10-PCS | Mod: CPTII,,, | Performed by: FAMILY MEDICINE

## 2023-12-13 PROCEDURE — 99212 OFFICE O/P EST SF 10 MIN: CPT | Mod: ,,, | Performed by: FAMILY MEDICINE

## 2023-12-13 PROCEDURE — 3078F PR MOST RECENT DIASTOLIC BLOOD PRESSURE < 80 MM HG: ICD-10-PCS | Mod: CPTII,,, | Performed by: FAMILY MEDICINE

## 2023-12-13 PROCEDURE — 1160F RVW MEDS BY RX/DR IN RCRD: CPT | Mod: CPTII,,, | Performed by: FAMILY MEDICINE

## 2023-12-13 PROCEDURE — 1159F MED LIST DOCD IN RCRD: CPT | Mod: CPTII,,, | Performed by: FAMILY MEDICINE

## 2023-12-13 PROCEDURE — 3044F HG A1C LEVEL LT 7.0%: CPT | Mod: CPTII,,, | Performed by: FAMILY MEDICINE

## 2023-12-13 PROCEDURE — 3008F PR BODY MASS INDEX (BMI) DOCUMENTED: ICD-10-PCS | Mod: CPTII,,, | Performed by: FAMILY MEDICINE

## 2023-12-13 PROCEDURE — 99212 PR OFFICE/OUTPT VISIT, EST, LEVL II, 10-19 MIN: ICD-10-PCS | Mod: ,,, | Performed by: FAMILY MEDICINE

## 2023-12-13 NOTE — PROGRESS NOTES
Patient ID: [id number]     Chief Complaint: Anxiety        HPI:     Jaycee Marie is a 33 y.o. female here today for a follow up anxiety.  - She has anxiety due to her brother dying on her birthday, she takes Xanax p.r.n. with resolution of symptoms, she only takes Xanax sparingly, no side effects, she still has Rx Xanax refill on file. She denies depression, SI/HI, or AH/VH. She is not interested in outpatient counseling.  - She is seeing Dr. Worrell (Hematology) for Beta Thalassemia Minor, stable, doing well.   - LBP is stable, she does see pain management p.r.n., holding off in injections at this time, controlled with exercises.   - She is not interested in flu vaccine or Prevnar-20 at this time.   - Patient is without any other complaints today.        ----------------------------  History of tubal ligation  Iron deficiency anemia  Low back pain  Obesity  Prediabetes  Right knee pain  Right shoulder pain  Scoliosis     Past Surgical History:   Procedure Laterality Date    TONSILLECTOMY N/A     TUBAL LIGATION N/A        Review of patient's allergies indicates:  No Known Allergies    Outpatient Medications Marked as Taking for the 12/13/23 encounter (Office Visit) with Janice Stevenson MD   Medication Sig Dispense Refill    ALPRAZolam (XANAX) 0.25 MG tablet Take 1 tablet (0.25 mg total) by mouth daily as needed for Anxiety. 15 tablet 1    diclofenac (VOLTAREN) 75 MG EC tablet Take 1 tablet (75 mg total) by mouth 2 (two) times daily as needed (pain/inflammatino). 60 tablet 2    ferrous gluconate (FERGON) 324 MG tablet Take 1 tablet by mouth 2 (two) times daily with meals.      gabapentin (NEURONTIN) 100 MG capsule Take 1 capsule (100 mg total) by mouth 3 (three) times daily. 90 capsule 2    levocetirizine (XYZAL) 5 MG tablet Take 5 mg by mouth nightly as needed.         Social History     Socioeconomic History    Marital status:    Tobacco Use    Smoking status: Never    Smokeless tobacco: Never  "  Substance and Sexual Activity    Alcohol use: Yes     Comment: 1-2 times per month    Drug use: Never    Sexual activity: Yes     Partners: Male        Family History   Problem Relation Age of Onset    Epilepsy Father         Subjective:       Review of Systems:    See HPI for details    Constitutional: Denies Change in appetite. Denies Chills. Denies Fever. Denies Night sweats.  Eye: Denies Blurred vision. Denies Discharge. Denies Eye pain.  ENT: Denies Decreased hearing. Denies Sore throat. Denies Swollen glands.  Respiratory: Denies Cough. Denies Shortness of breath. Denies Shortness of breath with exertion. Denies Wheezing.  Cardiovascular: Denies Chest pain at rest. Denies Chest pain with exertion. Denies Irregular heartbeat. Denies Palpitations.  Gastrointestinal: Denies Abdominal pain. Denies Diarrhea. Denies Nausea. Denies Vomiting. Denies Hematemesis or Hematochezia.  Genitourinary: Denies Dysuria. Denies Urinary frequency. Denies Urinary urgency. Denies Blood in urine.  Endocrine: Denies Cold intolerance. Denies Excessive thirst. Denies Heat intolerance. Denies Weight loss. Denies Weight gain.  Musculoskeletal: Denies Painful joints. Denies Weakness.  Integumentary: Denies Rash. Denies Itching. Denies Dry skin.  Neurologic: Denies Dizziness. Denies Fainting. Denies Headache.  Psychiatric: Denies Depression. Reports Anxiety. Denies Suicidal/Homicidal ideations.    All Other ROS: Negative except as stated in HPI.       Objective:     /72 (BP Location: Left arm, Patient Position: Sitting, BP Method: Medium (Automatic))   Pulse 64   Temp 97.6 °F (36.4 °C) (Oral)   Resp 20   Ht 5' 6" (1.676 m)   Wt 88.1 kg (194 lb 3.2 oz)   LMP 11/13/2023 (Approximate)   SpO2 97%   BMI 31.34 kg/m²     Physical Exam    General: Alert and oriented, No acute distress. Obesity.   Head: Normocephalic, Atraumatic.  Eye: Pupils are equal, round and reactive to light, Extraocular movements are intact, Sclera " non-icteric.  Ears/Nose/Throat: Normal, Mucosa moist,Clear.  Neck/Thyroid: Supple, Non-tender, No carotid bruit, No palpable thyromegaly or thyroid nodule, No lymphadenopathy, No JVD, Full range of motion.  Respiratory: Clear to auscultation bilaterally; No wheezes, rales or rhonchi,Non-labored respirations, Symmetrical chest wall expansion.  Cardiovascular: Regular rate and rhythm, S1/S2 normal, No murmurs, rubs or gallops.  Gastrointestinal: Soft, Non-tender, Non-distended, Normal bowel sounds, No palpable organomegaly.  Musculoskeletal: Normal range of motion.  Integumentary: Warm, Dry, Intact, No suspicious lesions or rashes.  Extremities: No clubbing, cyanosis or edema  Neurologic: No focal deficits, Cranial Nerves II-XII are grossly intact, Motor strength normal upper and lower extremities, Sensory exam intact.  Psychiatric: Normal interaction, Coherent speech, Euthymic mood, Appropriate affect         Assessment:       ICD-10-CM ICD-9-CM   1. Generalized anxiety disorder  F41.1 300.02        Plan:     Problem List Items Addressed This Visit    None  Visit Diagnoses       Generalized anxiety disorder    -  Primary         1. Generalized anxiety disorder  - Continue Rx Xanax to take sparingly, she still has Rx at home.  reviewed. Continue relaxation techniques. Will titrate medication as needed/tolerated. Notify M.D. or ER if symptoms persist or worsen, SI/HI, temp greater than 100.4, or any acute illness.    Start   /  Continue   Practice deep breathing or abdominal breathing exercises when anxiety occurs.  Exercise daily. Get sunlight daily.  Avoid caffeine, alcohol and stimulants.  Practice positive phrases and repeat throughout the day, yoga, lavender scents or Chamomile tea will help anxiety.  Set healthy boundaries, avoid people and conversations that increase stress.  Reports any symptoms of suicidal or homicidal ideations immediately, if clinic is closed go to nearest emergency room.       Jaycee was  seen today for anxiety.    Diagnoses and all orders for this visit:    Generalized anxiety disorder          Medication List with Changes/Refills   Current Medications    ALPRAZOLAM (XANAX) 0.25 MG TABLET    Take 1 tablet (0.25 mg total) by mouth daily as needed for Anxiety.       Start Date: 9/11/2023 End Date: --    DICLOFENAC (VOLTAREN) 75 MG EC TABLET    Take 1 tablet (75 mg total) by mouth 2 (two) times daily as needed (pain/inflammatino).       Start Date: 9/28/2023 End Date: --    FERROUS GLUCONATE (FERGON) 324 MG TABLET    Take 1 tablet by mouth 2 (two) times daily with meals.       Start Date: 1/26/2022 End Date: --    GABAPENTIN (NEURONTIN) 100 MG CAPSULE    Take 1 capsule (100 mg total) by mouth 3 (three) times daily.       Start Date: 9/28/2023 End Date: --    LEVOCETIRIZINE (XYZAL) 5 MG TABLET    Take 5 mg by mouth nightly as needed.       Start Date: 1/26/2022 End Date: --          Follow up in about 3 months (around 3/13/2024) for Anxiety followup, Virtual Visit.

## 2024-01-11 ENCOUNTER — ON-DEMAND VIRTUAL (OUTPATIENT)
Dept: URGENT CARE | Facility: CLINIC | Age: 34
End: 2024-01-11
Payer: COMMERCIAL

## 2024-01-11 DIAGNOSIS — K08.89 PAIN, DENTAL: Primary | ICD-10-CM

## 2024-01-11 PROCEDURE — 99212 OFFICE O/P EST SF 10 MIN: CPT | Mod: 95,,, | Performed by: NURSE PRACTITIONER

## 2024-01-11 RX ORDER — CHLORHEXIDINE GLUCONATE ORAL RINSE 1.2 MG/ML
15 SOLUTION DENTAL 2 TIMES DAILY
Qty: 473 ML | Refills: 0 | Status: SHIPPED | OUTPATIENT
Start: 2024-01-11 | End: 2024-01-18

## 2024-01-11 NOTE — PROGRESS NOTES
Subjective:      Patient ID: Jaycee Marie is a 33 y.o. female.    Vitals:  vitals were not taken for this visit.     Chief Complaint: Dental Pain (Right side of upper jaw)      Visit Type: TELE AUDIOVISUAL    Present with the patient at the time of consultation: TELEMED PRESENT WITH PATIENT: None    Past Medical History:   Diagnosis Date    History of tubal ligation 9/19/2013    Iron deficiency anemia 5/17/2022    Low back pain 5/17/2022    Obesity 5/17/2022    Prediabetes 5/17/2022    Right knee pain 5/17/2022    Right shoulder pain 5/17/2022    Scoliosis 5/17/2022     Past Surgical History:   Procedure Laterality Date    TONSILLECTOMY N/A     TUBAL LIGATION N/A      Review of patient's allergies indicates:  No Known Allergies  Current Outpatient Medications on File Prior to Visit   Medication Sig Dispense Refill    ALPRAZolam (XANAX) 0.25 MG tablet Take 1 tablet (0.25 mg total) by mouth daily as needed for Anxiety. 15 tablet 1    diclofenac (VOLTAREN) 75 MG EC tablet Take 1 tablet (75 mg total) by mouth 2 (two) times daily as needed (pain/inflammatino). 60 tablet 2    ferrous gluconate (FERGON) 324 MG tablet Take 1 tablet by mouth 2 (two) times daily with meals.      gabapentin (NEURONTIN) 100 MG capsule Take 1 capsule (100 mg total) by mouth 3 (three) times daily. 90 capsule 2    levocetirizine (XYZAL) 5 MG tablet Take 5 mg by mouth nightly as needed.       No current facility-administered medications on file prior to visit.     Family History   Problem Relation Age of Onset    Epilepsy Father        Medications Ordered                [facility] #[id number] - [place] - [address] [facility]   [address] [address]    Telephone: [phone]   Fax: [phone]   Hours: Not open 24 hours                         E-Prescribed (1 of 1)              chlorhexidine (PERIDEX) 0.12 % solution    Sig: Use as directed 15 mLs in the mouth or throat 2 (two) times daily. for 7 days       Start: 1/11/24      Quantity: 473 mL Refills: 0                           Ohs Peq Odvv Intake    1/11/2024 12:52 PM CST - Filed by Patient   Describe your reason for todays visit Tooth and gum pain   What is your current physical address in the event of a medical emergency? [address]   Are you able to take your vital signs? No   Please attach any relevant images or files          Patient reports tooth pain over the last two days. She was unable to sleep last night due to the pain. She currently has braces but is unable to get care from her orthodontist as they are away on vacation. She has been taking tylenol and diclofenac with only mild relief of her symptoms. Denies systemic symptoms like fever, chills. Is able to eat soft foods and drink. Has experienced similar symptoms in the past and has been able to treat with salt water rinses and NSAIDs. Does have a history of tooth infections but the last one was over two years ago.     Dental Pain   This is a recurrent problem. The current episode started yesterday. The problem has been gradually worsening. The pain is at a severity of 7/10. The pain is moderate. Associated symptoms include facial pain. Pertinent negatives include no difficulty swallowing, fever, oral bleeding, sinus pressure or thermal sensitivity. She has tried NSAIDs and acetaminophen for the symptoms. The treatment provided mild relief.       Constitution: Negative for fever.   HENT:  Positive for dental problem and facial swelling. Negative for sinus pressure, sore throat and trouble swallowing.         Objective:   The physical exam was conducted virtually.  Physical Exam   Constitutional: She is oriented to person, place, and time.   HENT:   Head: Normocephalic and atraumatic.   Mouth/Throat: Mucous membranes are moist.   Braces for approximately two years.       Comments: Braces for approximately two years.   Pulmonary/Chest: Effort normal.   Abdominal: Normal appearance.   Neurological: no focal  deficit. She is alert and oriented to person, place, and time.   Psychiatric: Her behavior is normal. Mood and thought content normal.       Assessment:     1. Pain, dental        Plan:       Pain, dental  -     chlorhexidine (PERIDEX) 0.12 % solution; Use as directed 15 mLs in the mouth or throat 2 (two) times daily. for 7 days  Dispense: 473 mL; Refill: 0  -     Encouraged to continue to use tylenol and NSAIDs for pain relief, continue salt water rinses  -     Discussed close follow up with dental provider and RTC if symptoms worsen or persist past 24-28 hours.

## 2024-03-13 ENCOUNTER — OFFICE VISIT (OUTPATIENT)
Dept: FAMILY MEDICINE | Facility: CLINIC | Age: 34
End: 2024-03-13
Payer: COMMERCIAL

## 2024-03-13 VITALS — WEIGHT: 190 LBS | BODY MASS INDEX: 30.67 KG/M2

## 2024-03-13 DIAGNOSIS — F41.1 GENERALIZED ANXIETY DISORDER: Primary | ICD-10-CM

## 2024-03-13 PROCEDURE — 1159F MED LIST DOCD IN RCRD: CPT | Mod: CPTII,95,, | Performed by: FAMILY MEDICINE

## 2024-03-13 PROCEDURE — 1160F RVW MEDS BY RX/DR IN RCRD: CPT | Mod: CPTII,95,, | Performed by: FAMILY MEDICINE

## 2024-03-13 PROCEDURE — 99212 OFFICE O/P EST SF 10 MIN: CPT | Mod: 95,,, | Performed by: FAMILY MEDICINE

## 2024-03-13 PROCEDURE — 3008F BODY MASS INDEX DOCD: CPT | Mod: CPTII,95,, | Performed by: FAMILY MEDICINE

## 2024-03-13 NOTE — PROGRESS NOTES
Patient ID: [id number]     Chief Complaint: Follow-up and Anxiety        HPI:     This is a telemedicine note. Patient was treated using telemedicine, real time audio and video, according to [facility] protocols. Janice MUHAMMAD M.D. , conducted the visit from the [facility]. The patient participated in the visit at a non-[facility] location selected by the patient, identified below. I am licensed in the state where the patient stated they are located. The patient stated that they understood and accepted the privacy and security risks to their information at their location. This visit is not recorded.    Patient was located at the patient's home.     Jaycee Marie is a 33 y.o. female here today for a telemedicine visit.    - She has anxiety due to her brother dying on her birthday, she takes Xanax p.r.n. with resolution of symptoms, she only takes Xanax sparingly, no side effects, she still has Rx Xanax refill on file. She denies depression, SI/HI, or AH/VH. She is not interested in outpatient counseling.  - Patient is without any other complaints today.        ----------------------------  History of tubal ligation  Iron deficiency anemia  Low back pain  Obesity  Prediabetes  Right knee pain  Right shoulder pain  Scoliosis     Past Surgical History:   Procedure Laterality Date    TONSILLECTOMY N/A     TUBAL LIGATION N/A        Review of patient's allergies indicates:  No Known Allergies    Outpatient Medications Marked as Taking for the 3/13/24 encounter (Office Visit) with Janice Stevenson MD   Medication Sig Dispense Refill    ALPRAZolam (XANAX) 0.25 MG tablet Take 1 tablet (0.25 mg total) by mouth daily as needed for Anxiety. 15 tablet 1    diclofenac (VOLTAREN) 75 MG EC tablet Take 1 tablet (75 mg total) by mouth 2 (two) times daily as needed (pain/inflammatino). 60 tablet 2    ferrous gluconate (FERGON) 324 MG tablet Take 1 tablet by mouth 2 (two) times daily with meals.      gabapentin  (NEURONTIN) 100 MG capsule Take 1 capsule (100 mg total) by mouth 3 (three) times daily. 90 capsule 2    levocetirizine (XYZAL) 5 MG tablet Take 5 mg by mouth nightly as needed.         Social History     Socioeconomic History    Marital status:    Tobacco Use    Smoking status: Never    Smokeless tobacco: Never   Substance and Sexual Activity    Alcohol use: Yes     Comment: 1-2 times per month    Drug use: Never    Sexual activity: Yes     Partners: Male     Social Determinants of Health     Financial Resource Strain: Low Risk  (3/13/2024)    Overall Financial Resource Strain (CARDIA)     Difficulty of Paying Living Expenses: Not very hard   Food Insecurity: No Food Insecurity (3/13/2024)    Hunger Vital Sign     Worried About Running Out of Food in the Last Year: Never true     Ran Out of Food in the Last Year: Never true   Transportation Needs: No Transportation Needs (3/13/2024)    PRAPARE - Transportation     Lack of Transportation (Medical): No     Lack of Transportation (Non-Medical): No   Physical Activity: Sufficiently Active (3/13/2024)    Exercise Vital Sign     Days of Exercise per Week: 3 days     Minutes of Exercise per Session: 60 min   Stress: Stress Concern Present (3/13/2024)    [place] [place] [employer] - Occupational Stress Questionnaire     Feeling of Stress : To some extent   Social Connections: Unknown (3/13/2024)    Social Connection and Isolation Panel [NHANES]     Frequency of Communication with Friends and Family: Once a week     Frequency of Social Gatherings with Friends and Family: Once a week     Active Member of Clubs or Organizations: No     Attends Club or Organization Meetings: Never     Marital Status: Patient declined   Housing Stability: Low Risk  (3/13/2024)    Housing Stability Vital Sign     Unable to Pay for Housing in the Last Year: No     Number of Places Lived in the Last Year: 1     Unstable Housing in the Last Year: No        Family History    Problem Relation Age of Onset    Epilepsy Father         Subjective:       See HPI for details    Constitutional: Denies Change in appetite. Denies Chills. Denies Fever. Denies Night sweats.  Eye: Denies Blurred vision. Denies Discharge. Denies Eye pain.  ENT: Denies Decreased hearing. Denies Sore throat. Denies Swollen glands.  Respiratory: Denies Cough. Denies Shortness of breath. Denies Shortness of breath with exertion. Denies Wheezing.  Cardiovascular: Denies Chest pain at rest. Denies Chest pain with exertion. Denies Irregular heartbeat. Denies Palpitations.  Gastrointestinal: Denies Abdominal pain. Denies Diarrhea. Denies Nausea. Denies Vomiting. Denies Hematemesis or Hematochezia.  Genitourinary: Denies Dysuria. Denies Urinary frequency. Denies Urinary urgency. Denies Blood in urine.  Endocrine: Denies Cold intolerance. Denies Excessive thirst. Denies Heat intolerance. Denies Weight loss. Denies Weight gain.  Musculoskeletal: Denies Painful joints. Denies Weakness.  Integumentary: Denies Rash. Denies Itching. Denies Dry skin.  Neurologic: Denies Dizziness. Denies Fainting. Denies Headache.  Psychiatric: Denies Depression. Denies Anxiety. Denies Suicidal/Homicidal ideations.    All Other ROS: Negative except as stated in HPI.   Answers submitted by the patient for this visit:  Review of Systems Questionnaire (Submitted on 3/13/2024)  activity change: No  unexpected weight change: No  neck pain: No  hearing loss: No  rhinorrhea: No  trouble swallowing: No  eye discharge: No  visual disturbance: No  chest tightness: No  wheezing: No  chest pain: No  palpitations: No  blood in stool: No  constipation: No  vomiting: No  diarrhea: No  polydipsia: No  polyuria: No  difficulty urinating: No  hematuria: No  menstrual problem: No  dysuria: No  joint swelling: No  arthralgias: No  headaches: No  weakness: No  confusion: No  dysphoric mood: No      Objective:     Wt 86.2 kg (190 lb)   LMP 03/08/2024   BMI 30.67  kg/m²     Physical Exam    Physical Exam: LIMITED DUE TO TELEMEDICINE RESTRICTIONS.  General: Alert and oriented, No acute distress. Obese.   Head: Normocephalic, Atraumatic.  Eye: Sclera non-icteric.  Neck/Thyroid:  Full range of motion.  Respiratory: Non-labored respirations, Symmetrical chest wall expansion.  Musculoskeletal: Normal range of motion.  Integumentary: Warm, Dry, Intact, No visible suspicious lesions or rashes. No diaphoresis.   Neurologic: No focal deficits  Psychiatric: Normal interaction, Coherent speech, Euthymic mood, Appropriate affect.      Assessment:       ICD-10-CM ICD-9-CM   1. Generalized anxiety disorder  F41.1 300.02        Plan:     Problem List Items Addressed This Visit    None  Visit Diagnoses       Generalized anxiety disorder    -  Primary         1. Generalized anxiety disorder  - Continue Rx Xanax to take sparingly, she still has Rx at home.  reviewed. Continue relaxation techniques. Will titrate medication as needed/tolerated. Notify M.D. or ER if symptoms persist or worsen, SI/HI, temp greater than 100.4, or any acute illness.    Start   /  Continue   Practice deep breathing or abdominal breathing exercises when anxiety occurs.  Exercise daily. Get sunlight daily.  Avoid caffeine, alcohol and stimulants.  Practice positive phrases and repeat throughout the day, yoga, lavender scents or Chamomile tea will help anxiety.  Set healthy boundaries, avoid people and conversations that increase stress.  Reports any symptoms of suicidal or homicidal ideations immediately, if clinic is closed go to nearest emergency room.       Jaycee was seen today for follow-up and anxiety.    Diagnoses and all orders for this visit:    Generalized anxiety disorder          Medication List with Changes/Refills   Current Medications    ALPRAZOLAM (XANAX) 0.25 MG TABLET    Take 1 tablet (0.25 mg total) by mouth daily as needed for Anxiety.       Start Date: 9/11/2023 End Date: --    DICLOFENAC  (VOLTAREN) 75 MG EC TABLET    Take 1 tablet (75 mg total) by mouth 2 (two) times daily as needed (pain/inflammatino).       Start Date: 9/28/2023 End Date: --    FERROUS GLUCONATE (FERGON) 324 MG TABLET    Take 1 tablet by mouth 2 (two) times daily with meals.       Start Date: 1/26/2022 End Date: --    GABAPENTIN (NEURONTIN) 100 MG CAPSULE    Take 1 capsule (100 mg total) by mouth 3 (three) times daily.       Start Date: 9/28/2023 End Date: --    LEVOCETIRIZINE (XYZAL) 5 MG TABLET    Take 5 mg by mouth nightly as needed.       Start Date: 1/26/2022 End Date: --          Follow up in about 6 months (around 9/12/2024) for Wellness, Anxiety followup.      Audio/Video Time Documentation:  Spent 4 minutes for telemedicine visit with successful audio/visual connection. [facility] was used for billing.

## 2024-08-14 ENCOUNTER — HOSPITAL ENCOUNTER (EMERGENCY)
Facility: HOSPITAL | Age: 34
Discharge: HOME | End: 2024-08-14
Attending: STUDENT IN AN ORGANIZED HEALTH CARE EDUCATION/TRAINING PROGRAM
Payer: COMMERCIAL

## 2024-08-14 VITALS
TEMPERATURE: 100 F | HEART RATE: 118 BPM | DIASTOLIC BLOOD PRESSURE: 68 MMHG | SYSTOLIC BLOOD PRESSURE: 110 MMHG | BODY MASS INDEX: 31.96 KG/M2 | HEIGHT: 66 IN | OXYGEN SATURATION: 97 % | WEIGHT: 198.88 LBS | RESPIRATION RATE: 18 BRPM

## 2024-08-14 DIAGNOSIS — R52 PAIN: ICD-10-CM

## 2024-08-14 DIAGNOSIS — M79.602 LEFT ARM PAIN: ICD-10-CM
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
## 2024-08-14 DIAGNOSIS — T14.90XA TRAUMA: ICD-10-CM

## 2024-08-14 LAB
B-HCG UR QL: NEGATIVE
CTP QC/QA: YES

## 2024-08-14 PROCEDURE — 99284 EMERGENCY DEPT VISIT MOD MDM: CPT | Mod: 25

## 2024-08-14 PROCEDURE — 81025 URINE PREGNANCY TEST: CPT | Performed by: STUDENT IN AN ORGANIZED HEALTH CARE EDUCATION/TRAINING PROGRAM

## 2024-08-14 PROCEDURE — 25000003 PHARM REV CODE 250: Performed by: STUDENT IN AN ORGANIZED HEALTH CARE EDUCATION/TRAINING PROGRAM

## 2024-08-14 RX ORDER — HYDROCODONE BITARTRATE AND ACETAMINOPHEN 5; 325 MG/1; MG/1
1 TABLET ORAL
Status: COMPLETED | OUTPATIENT
Start: 2024-08-14 | End: 2024-08-14

## 2024-08-14 RX ORDER — IBUPROFEN 600 MG/1
600 TABLET ORAL EVERY 6 HOURS PRN
Qty: 20 TABLET | Refills: 0 | Status: SHIPPED | OUTPATIENT
Start: 2024-08-14 | End: 2024-09-12

## 2024-08-14 RX ORDER — DEXTROMETHORPHAN HYDROBROMIDE, GUAIFENESIN 5; 100 MG/5ML; MG/5ML
650 LIQUID ORAL EVERY 8 HOURS PRN
Qty: 20 TABLET | Refills: 0 | Status: SHIPPED | OUTPATIENT
Start: 2024-08-14 | End: 2024-09-12

## 2024-08-14 RX ADMIN — HYDROCODONE BITARTRATE AND ACETAMINOPHEN 1 TABLET: 5; 325 TABLET ORAL at 09:08

## 2024-08-14 NOTE — Clinical Note
"Jaycee Zelaya" Cynthia was seen and treated in our emergency department on 8/14/2024.  She may return to work on 08/17/2024.       If you have any questions or concerns, please don't hesitate to call.      Nawaf Plascencia RN    "

## 2024-09-05 ENCOUNTER — TELEPHONE (OUTPATIENT)
Dept: FAMILY MEDICINE | Facility: CLINIC | Age: 34
End: 2024-09-05
Payer: COMMERCIAL

## 2024-09-05 DIAGNOSIS — Z00.00 LABORATORY EXAMINATION ORDERED AS PART OF A ROUTINE GENERAL MEDICAL EXAMINATION: Primary | ICD-10-CM

## 2024-09-05 NOTE — TELEPHONE ENCOUNTER
Are there any outstanding tasks in the patients's chart (ex.labs,MM,etc)?  no  Do we have outstanding/pending referrals?  no  Has the patient been seen in and ER,UCC, or been admitted since last visit?  yes  Has the patient seen any other health care provider(doctors) since last visit?  no  Has the patient had any bloodwork or x-rays done since last visit?  no

## 2024-09-09 ENCOUNTER — DOCUMENTATION ONLY (OUTPATIENT)
Facility: CLINIC | Age: 34
End: 2024-09-09
Payer: COMMERCIAL

## 2024-09-11 ENCOUNTER — PATIENT MESSAGE (OUTPATIENT)
Dept: ADMINISTRATIVE | Facility: HOSPITAL | Age: 34
End: 2024-09-11
Payer: COMMERCIAL

## 2024-09-11 ENCOUNTER — PATIENT MESSAGE (OUTPATIENT)
Dept: FAMILY MEDICINE | Facility: CLINIC | Age: 34
End: 2024-09-11
Payer: COMMERCIAL

## 2024-09-12 ENCOUNTER — LAB VISIT (OUTPATIENT)
Dept: LAB | Facility: HOSPITAL | Age: 34
End: 2024-09-12
Attending: FAMILY MEDICINE
Payer: COMMERCIAL

## 2024-09-12 ENCOUNTER — PATIENT OUTREACH (OUTPATIENT)
Facility: CLINIC | Age: 34
End: 2024-09-12
Payer: COMMERCIAL

## 2024-09-12 ENCOUNTER — OFFICE VISIT (OUTPATIENT)
Dept: FAMILY MEDICINE | Facility: CLINIC | Age: 34
End: 2024-09-12
Payer: COMMERCIAL

## 2024-09-12 VITALS
BODY MASS INDEX: 32.14 KG/M2 | HEART RATE: 71 BPM | SYSTOLIC BLOOD PRESSURE: 115 MMHG | RESPIRATION RATE: 16 BRPM | WEIGHT: 200 LBS | HEIGHT: 66 IN | OXYGEN SATURATION: 98 % | DIASTOLIC BLOOD PRESSURE: 72 MMHG

## 2024-09-12 DIAGNOSIS — E55.9 VITAMIN D DEFICIENCY: ICD-10-CM

## 2024-09-12 DIAGNOSIS — R82.71 BACTERIA IN URINE: ICD-10-CM

## 2024-09-12 DIAGNOSIS — D56.3 BETA THALASSEMIA MINOR: ICD-10-CM

## 2024-09-12 DIAGNOSIS — Z00.00 WELLNESS EXAMINATION: Primary | ICD-10-CM

## 2024-09-12 DIAGNOSIS — D47.3 ESSENTIAL THROMBOCYTOSIS: ICD-10-CM

## 2024-09-12 DIAGNOSIS — Z00.00 LABORATORY EXAMINATION ORDERED AS PART OF A ROUTINE GENERAL MEDICAL EXAMINATION: ICD-10-CM

## 2024-09-12 DIAGNOSIS — R80.9 PROTEINURIA, UNSPECIFIED TYPE: ICD-10-CM

## 2024-09-12 DIAGNOSIS — D50.9 IRON DEFICIENCY ANEMIA, UNSPECIFIED IRON DEFICIENCY ANEMIA TYPE: Chronic | ICD-10-CM

## 2024-09-12 DIAGNOSIS — E66.09 CLASS 1 OBESITY DUE TO EXCESS CALORIES WITHOUT SERIOUS COMORBIDITY WITH BODY MASS INDEX (BMI) OF 32.0 TO 32.9 IN ADULT: ICD-10-CM

## 2024-09-12 LAB
25(OH)D3+25(OH)D2 SERPL-MCNC: 7 NG/ML (ref 30–80)
ALBUMIN SERPL-MCNC: 3.6 G/DL (ref 3.5–5)
ALBUMIN/GLOB SERPL: 0.9 RATIO (ref 1.1–2)
ALP SERPL-CCNC: 62 UNIT/L (ref 40–150)
ALT SERPL-CCNC: 8 UNIT/L (ref 0–55)
ANION GAP SERPL CALC-SCNC: 7 MEQ/L
ANISOCYTOSIS BLD QL SMEAR: ABNORMAL
AST SERPL-CCNC: 14 UNIT/L (ref 5–34)
BACTERIA #/AREA URNS AUTO: ABNORMAL /HPF
BACTERIA #/AREA URNS AUTO: ABNORMAL /HPF
BASOPHILS # BLD AUTO: 0.06 X10(3)/MCL
BASOPHILS NFR BLD AUTO: 0.9 %
BILIRUB SERPL-MCNC: 0.2 MG/DL
BILIRUB UR QL STRIP.AUTO: NEGATIVE
BILIRUB UR QL STRIP.AUTO: NEGATIVE
BUN SERPL-MCNC: 9.7 MG/DL (ref 7–18.7)
CALCIUM SERPL-MCNC: 9.2 MG/DL (ref 8.4–10.2)
CHLORIDE SERPL-SCNC: 109 MMOL/L (ref 98–107)
CHOLEST SERPL-MCNC: 144 MG/DL
CHOLEST/HDLC SERPL: 3 {RATIO} (ref 0–5)
CLARITY UR: ABNORMAL
CLARITY UR: CLEAR
CO2 SERPL-SCNC: 24 MMOL/L (ref 22–29)
COLOR UR AUTO: ABNORMAL
COLOR UR AUTO: YELLOW
CREAT SERPL-MCNC: 0.81 MG/DL (ref 0.55–1.02)
CREAT/UREA NIT SERPL: 12
ELLIPTOCYTOSIS (OHS): SLIGHT
EOSINOPHIL # BLD AUTO: 0.08 X10(3)/MCL (ref 0–0.9)
EOSINOPHIL NFR BLD AUTO: 1.2 %
ERYTHROCYTE [DISTWIDTH] IN BLOOD BY AUTOMATED COUNT: 21.7 % (ref 11.5–17)
EST. AVERAGE GLUCOSE BLD GHB EST-MCNC: 108.3 MG/DL
FERRITIN SERPL-MCNC: 5.47 NG/ML (ref 4.63–204)
GFR SERPLBLD CREATININE-BSD FMLA CKD-EPI: >60 ML/MIN/1.73/M2
GLOBULIN SER-MCNC: 4.1 GM/DL (ref 2.4–3.5)
GLUCOSE SERPL-MCNC: 95 MG/DL (ref 74–100)
GLUCOSE UR QL STRIP: NORMAL
GLUCOSE UR QL STRIP: NORMAL
HBA1C MFR BLD: 5.4 %
HCT VFR BLD AUTO: 30.8 % (ref 37–47)
HDLC SERPL-MCNC: 44 MG/DL (ref 35–60)
HGB BLD-MCNC: 9.1 G/DL (ref 12–16)
HGB UR QL STRIP: NEGATIVE
HGB UR QL STRIP: NEGATIVE
HYALINE CASTS #/AREA URNS LPF: ABNORMAL /LPF
IMM GRANULOCYTES # BLD AUTO: 0.02 X10(3)/MCL (ref 0–0.04)
IMM GRANULOCYTES NFR BLD AUTO: 0.3 %
IRON SATN MFR SERPL: 8 % (ref 20–50)
IRON SERPL-MCNC: 25 UG/DL (ref 50–170)
KETONES UR QL STRIP: NEGATIVE
KETONES UR QL STRIP: NEGATIVE
LDLC SERPL CALC-MCNC: 85 MG/DL (ref 50–140)
LEUKOCYTE ESTERASE UR QL STRIP: 25
LEUKOCYTE ESTERASE UR QL STRIP: NEGATIVE
LYMPHOCYTES # BLD AUTO: 2.88 X10(3)/MCL (ref 0.6–4.6)
LYMPHOCYTES NFR BLD AUTO: 44.9 %
MCH RBC QN AUTO: 19.9 PG (ref 27–31)
MCHC RBC AUTO-ENTMCNC: 29.5 G/DL (ref 33–36)
MCV RBC AUTO: 67.4 FL (ref 80–94)
MICROCYTES BLD QL SMEAR: ABNORMAL
MONOCYTES # BLD AUTO: 0.79 X10(3)/MCL (ref 0.1–1.3)
MONOCYTES NFR BLD AUTO: 12.3 %
MUCOUS THREADS URNS QL MICRO: ABNORMAL /LPF
MUCOUS THREADS URNS QL MICRO: ABNORMAL /LPF
NEUTROPHILS # BLD AUTO: 2.58 X10(3)/MCL (ref 2.1–9.2)
NEUTROPHILS NFR BLD AUTO: 40.4 %
NITRITE UR QL STRIP: NEGATIVE
NITRITE UR QL STRIP: NEGATIVE
NRBC BLD AUTO-RTO: 0 %
PH UR STRIP: 5.5 [PH]
PH UR STRIP: 5.5 [PH]
PLATELET # BLD AUTO: 414 X10(3)/MCL (ref 130–400)
PLATELET # BLD EST: ADEQUATE 10*3/UL
PLATELETS.RETICULATED NFR BLD AUTO: 3.4 % (ref 0.9–11.2)
PMV BLD AUTO: 10.1 FL (ref 7.4–10.4)
POTASSIUM SERPL-SCNC: 3.7 MMOL/L (ref 3.5–5.1)
PROT SERPL-MCNC: 7.7 GM/DL (ref 6.4–8.3)
PROT UR QL STRIP: ABNORMAL
PROT UR QL STRIP: NEGATIVE
RBC # BLD AUTO: 4.57 X10(6)/MCL (ref 4.2–5.4)
RBC #/AREA URNS AUTO: ABNORMAL /HPF
RBC #/AREA URNS AUTO: ABNORMAL /HPF
RBC MORPH BLD: ABNORMAL
SODIUM SERPL-SCNC: 140 MMOL/L (ref 136–145)
SP GR UR STRIP.AUTO: 1.02 (ref 1–1.03)
SP GR UR STRIP.AUTO: 1.03 (ref 1–1.03)
SQUAMOUS #/AREA URNS LPF: ABNORMAL /HPF
SQUAMOUS #/AREA URNS LPF: ABNORMAL /HPF
TIBC SERPL-MCNC: 301 UG/DL (ref 70–310)
TIBC SERPL-MCNC: 326 UG/DL (ref 250–450)
TRANSFERRIN SERPL-MCNC: 316 MG/DL (ref 180–382)
TRIGL SERPL-MCNC: 75 MG/DL (ref 37–140)
TSH SERPL-ACNC: 2.03 UIU/ML (ref 0.35–4.94)
UROBILINOGEN UR STRIP-ACNC: NORMAL
UROBILINOGEN UR STRIP-ACNC: NORMAL
VLDLC SERPL CALC-MCNC: 15 MG/DL
WBC # BLD AUTO: 6.41 X10(3)/MCL (ref 4.5–11.5)
WBC #/AREA URNS AUTO: ABNORMAL /HPF
WBC #/AREA URNS AUTO: ABNORMAL /HPF

## 2024-09-12 PROCEDURE — 87186 SC STD MICRODIL/AGAR DIL: CPT | Performed by: FAMILY MEDICINE

## 2024-09-12 PROCEDURE — 82728 ASSAY OF FERRITIN: CPT

## 2024-09-12 PROCEDURE — 3008F BODY MASS INDEX DOCD: CPT | Mod: CPTII,,, | Performed by: FAMILY MEDICINE

## 2024-09-12 PROCEDURE — 84443 ASSAY THYROID STIM HORMONE: CPT

## 2024-09-12 PROCEDURE — 36415 COLL VENOUS BLD VENIPUNCTURE: CPT

## 2024-09-12 PROCEDURE — 82306 VITAMIN D 25 HYDROXY: CPT

## 2024-09-12 PROCEDURE — 83540 ASSAY OF IRON: CPT

## 2024-09-12 PROCEDURE — 81001 URINALYSIS AUTO W/SCOPE: CPT | Performed by: FAMILY MEDICINE

## 2024-09-12 PROCEDURE — 3044F HG A1C LEVEL LT 7.0%: CPT | Mod: CPTII,,, | Performed by: FAMILY MEDICINE

## 2024-09-12 PROCEDURE — 80053 COMPREHEN METABOLIC PANEL: CPT

## 2024-09-12 PROCEDURE — 3078F DIAST BP <80 MM HG: CPT | Mod: CPTII,,, | Performed by: FAMILY MEDICINE

## 2024-09-12 PROCEDURE — 3074F SYST BP LT 130 MM HG: CPT | Mod: CPTII,,, | Performed by: FAMILY MEDICINE

## 2024-09-12 PROCEDURE — 81001 URINALYSIS AUTO W/SCOPE: CPT

## 2024-09-12 PROCEDURE — 83036 HEMOGLOBIN GLYCOSYLATED A1C: CPT

## 2024-09-12 PROCEDURE — 85025 COMPLETE CBC W/AUTO DIFF WBC: CPT

## 2024-09-12 PROCEDURE — 87086 URINE CULTURE/COLONY COUNT: CPT | Performed by: FAMILY MEDICINE

## 2024-09-12 PROCEDURE — 1160F RVW MEDS BY RX/DR IN RCRD: CPT | Mod: CPTII,,, | Performed by: FAMILY MEDICINE

## 2024-09-12 PROCEDURE — 1159F MED LIST DOCD IN RCRD: CPT | Mod: CPTII,,, | Performed by: FAMILY MEDICINE

## 2024-09-12 PROCEDURE — 99395 PREV VISIT EST AGE 18-39: CPT | Mod: ,,, | Performed by: FAMILY MEDICINE

## 2024-09-12 PROCEDURE — 87077 CULTURE AEROBIC IDENTIFY: CPT | Performed by: FAMILY MEDICINE

## 2024-09-12 PROCEDURE — 80061 LIPID PANEL: CPT

## 2024-09-12 RX ORDER — ERGOCALCIFEROL 1.25 MG/1
50000 CAPSULE ORAL
Qty: 12 CAPSULE | Refills: 0 | Status: SHIPPED | OUTPATIENT
Start: 2024-09-12 | End: 2024-12-11

## 2024-09-12 NOTE — PROGRESS NOTES
Patient ID: [id number]     Chief Complaint: Annual Exam        HPI:     Jaycee Marie is a 34 y.o. female here today for annual wellness exam.  Well Adult History   The patient presents for well adult exam. The patient's general health status is described as good. The patient's diet is described as balanced. Exercise: occasional. Associated symptoms consist of denies weight loss, denies weight gain, denies fatigue, denies headache, denies snoring, denies hearing loss and denies vision changes. Last menstrual period: 08/14/2024, regular, lasts 6 days. Additional pertinent history: last dental exam: goes every 6 months, has braces, orthodontist every 3 weeks, last eye exam: > 1 year ago (wears eyeglasses, she has vision insurance, she will schedule an appointment next available), last pap smear : 04/07/2021 ([facility]), she would like referral to GYN for pap smear,  seat belt use, occasional caffeine use (soft drinks), tobacco use none, social alcohol use and She had labs done on 09/12/2024, here to discuss the results. She would not like STD screening. She is not interested in flu vaccine or COVID-19 booster, but she is UTD on vaccines. She is obese and she will work on it on her own, not interested in weight loss Rx.  - She has beta thalassemia minor, followed by Hematology (Dr. Worrell), stable, she has fatigue and craving ice, but she is other wise asymptomatic, she cannot take oral iron due to stomach ache, she would like to proceed with iron infusion if eligible.    - She has anxiety due to her brother dying on her birthday, she takes Xanax p.r.n. with resolution of symptoms, she has Rx at home, she only takes Xanax sparingly, no side effects. She denies depression, SI/HI, or AH/VH.   - Patient is without any other complaints today.    Advance Care Planning     Date: 09/12/2024  Patient did not wish or was not able to name a surrogate decision maker or provide an Advance Care Plan.         -------------------------------------    History of tubal ligation    Iron deficiency anemia    Low back pain    Obesity    Prediabetes    Right knee pain    Right shoulder pain    Scoliosis        Past Surgical History:   Procedure Laterality Date    TONSILLECTOMY N/A     TONSILLECTOMY  2009    TUBAL LIGATION N/A        Review of patient's allergies indicates:  No Known Allergies    Outpatient Medications Marked as Taking for the 9/12/24 encounter (Office Visit) with Janice Stevenson MD   Medication Sig Dispense Refill    ALPRAZolam (XANAX) 0.25 MG tablet Take 1 tablet (0.25 mg total) by mouth daily as needed for Anxiety. 15 tablet 1    diclofenac (VOLTAREN) 75 MG EC tablet Take 1 tablet (75 mg total) by mouth 2 (two) times daily as needed (pain/inflammatino). 60 tablet 2    levocetirizine (XYZAL) 5 MG tablet Take 5 mg by mouth nightly as needed.         Social History     Socioeconomic History    Marital status:    Tobacco Use    Smoking status: Never    Smokeless tobacco: Never   Substance and Sexual Activity    Alcohol use: Yes     Alcohol/week: 2.0 standard drinks of alcohol     Types: 2 Glasses of wine per week     Comment: 1-2 times per month    Drug use: Never    Sexual activity: Yes     Partners: Male     Birth control/protection: None     Social Determinants of Health     Financial Resource Strain: Low Risk  (3/13/2024)    Overall Financial Resource Strain (CARDIA)     Difficulty of Paying Living Expenses: Not very hard   Food Insecurity: No Food Insecurity (3/13/2024)    Hunger Vital Sign     Worried About Running Out of Food in the Last Year: Never true     Ran Out of Food in the Last Year: Never true   Transportation Needs: No Transportation Needs (3/13/2024)    PRAPARE - Transportation     Lack of Transportation (Medical): No     Lack of Transportation (Non-Medical): No   Physical Activity: Sufficiently Active (3/13/2024)    Exercise Vital Sign     Days of Exercise per Week: 3 days     " Minutes of Exercise per Session: 60 min   Stress: Stress Concern Present (3/13/2024)    [place] [place] [employer] - Occupational Stress Questionnaire     Feeling of Stress : To some extent   Housing Stability: Low Risk  (3/13/2024)    Housing Stability Vital Sign     Unable to Pay for Housing in the Last Year: No     Number of Places Lived in the Last Year: 1     Unstable Housing in the Last Year: No        Family History   Problem Relation Name Age of Onset    Epilepsy Father      Diabetes Sister Cristin         Subjective:       Review of Systems:    See HPI for details    Constitutional: As per HPI. Denies Change in appetite. Denies Chills. Denies Fever. Denies Night sweats.  Eye: Denies Blurred vision. Denies Discharge. Denies Eye pain.  ENT: Denies Decreased hearing. Denies Sore throat. Denies Swollen glands.  Respiratory: Denies Cough. Denies Shortness of breath. Denies Shortness of breath with exertion. Denies Wheezing.  Cardiovascular: Denies Chest pain at rest. Denies Chest pain with exertion. Denies Irregular heartbeat. Denies Palpitations.  Gastrointestinal: Denies Abdominal pain. Denies Diarrhea. Denies Nausea. Denies Vomiting. Denies Hematemesis or Hematochezia.  Genitourinary: Denies Dysuria. Denies Urinary frequency. Denies Urinary urgency. Denies Blood in urine.  Endocrine: Denies Cold intolerance. Denies Excessive thirst. Denies Heat intolerance. Denies Weight loss. Denies Weight gain.  Musculoskeletal: Denies Painful joints. Denies Weakness.  Integumentary: Denies Rash. Denies Itching. Denies Dry skin.  Neurologic: Denies Dizziness. Denies Fainting. Denies Headache.  Psychiatric: Denies Depression. Denies Anxiety. Denies Suicidal/Homicidal ideations.    All Other ROS: Negative except as stated in HPI.       Objective:     /72 (BP Location: Right arm, Patient Position: Sitting, BP Method: Large (Automatic))   Pulse 71   Resp 16   Ht 5' 6" (1.676 m)   Wt 90.7 kg (200 lb)   " LMP 08/14/2024 (Approximate)   SpO2 98%   BMI 32.28 kg/m²     Physical Exam    General: Alert and oriented, No acute distress. Obese.   Head: Normocephalic, Atraumatic.  Eye: Pupils are equal, round and reactive to light, Extraocular movements are intact, Sclera non-icteric.  Ears/Nose/Throat: Normal, Mucosa moist,Clear.  Neck/Thyroid: Supple, Non-tender, No carotid bruit, No palpable thyromegaly or thyroid nodule, No lymphadenopathy, No JVD, Full range of motion.  Respiratory: Clear to auscultation bilaterally; No wheezes, rales or rhonchi,Non-labored respirations, Symmetrical chest wall expansion.  Cardiovascular: Regular rate and rhythm, S1/S2 normal, No murmurs, rubs or gallops.  Gastrointestinal: Soft, Non-tender, Non-distended, Normal bowel sounds, No palpable organomegaly.  Musculoskeletal: Normal range of motion.  Integumentary: Warm, Dry, Intact, No suspicious lesions or rashes.  Extremities: No clubbing, cyanosis or edema  Neurologic: No focal deficits, Cranial Nerves II-XII are grossly intact, Motor strength normal upper and lower extremities, Sensory exam intact.  Psychiatric: Normal interaction, Coherent speech, Euthymic mood, Appropriate affect     *Lab results from 09/12/2024 were reviewed and discussed with patient and patient voices understanding.*        Assessment:       ICD-10-CM ICD-9-CM   1. Wellness examination  Z00.00 V70.0   2. Class 1 obesity due to excess calories without serious comorbidity with body mass index (BMI) of 32.0 to 32.9 in adult  E66.09 278.00    Z68.32 V85.32   3. Beta thalassemia minor  D56.3 282.46   4. Essential thrombocytosis  D47.3 238.71   5. Iron deficiency anemia, unspecified iron deficiency anemia type  D50.9 280.9   6. Vitamin D deficiency  E55.9 268.9   7. Bacteria in urine  R82.71 791.9   8. Proteinuria, unspecified type  R80.9 791.0        Plan:     Problem List Items Addressed This Visit          Oncology    Iron deficiency anemia (Chronic)    Relevant Orders     Ambulatory referral/consult to Hematology / Oncology    Beta thalassemia minor    Relevant Orders    Ambulatory referral/consult to Hematology / Oncology    Essential thrombocytosis    Relevant Orders    Ambulatory referral/consult to Hematology / Oncology       Endocrine    Vitamin D deficiency    Relevant Medications    ergocalciferol (ERGOCALCIFEROL) 50,000 unit Cap    Other Relevant Orders    Vitamin D     Other Visit Diagnoses       Wellness examination    -  Primary    Relevant Orders    Ambulatory referral/consult to Obstetrics / Gynecology    Class 1 obesity due to excess calories without serious comorbidity with body mass index (BMI) of 32.0 to 32.9 in adult        Bacteria in urine        Relevant Orders    Urinalysis, Reflex to Urine Culture    Proteinuria, unspecified type        Relevant Orders    Urinalysis, Reflex to Urine Culture         1. Wellness examination  - Ambulatory referral/consult to Obstetrics / Gynecology; Future for annual with pap smear.   -  Monthly breast self exam encouraged. Diet, exercise, and 10% weight loss encouraged. Keep appointment for dental exams x q6 months as scheduled. Keep appointment for annual eye exam as scheduled. Keep appointment with GYN for annual pap smear as scheduled. Keep appointment with specialists as scheduled. Notify M.D. or ER if temp greater than 100.4, or any acute illness.      2. Class 1 obesity due to excess calories without serious comorbidity with body mass index (BMI) of 32.0 to 32.9 in adult  Body mass index is 32.28 kg/m².  Goal BMI <30.  Exercise 5 times a week for 30 minutes per day.  Avoid soda, simple sugars, excessive rice, potatoes or bread. Limit fast foods and fried foods.  Choose complex carbs in moderation (example: green vegetables, beans, oatmeal). Eat plenty of fresh fruits and vegetables with lean meats daily.  Do not skip meals. Eat a balanced portion size.  Avoid fad diets. Consider permanent healthy life style changes.       3. Beta thalassemia minor  - Continue oral iron for now and iron rich diet. Ambulatory referral/consult to Hematology / Oncology; Future for evaluation for iron infusion. Notify M.D. or ER if symptoms persist or worsen, active bleeding, temp >100.4, or any acute illness.      4. Essential thrombocytosis  - Ambulatory referral/consult to Hematology / Oncology; Future  - Same as #2.     5. Iron deficiency anemia, unspecified iron deficiency anemia type  - Ambulatory referral/consult to Hematology / Oncology; Future  - Same as #2.     6. Vitamin D deficiency  - Rx ergocalciferol (ERGOCALCIFEROL) 50,000 unit Cap; Take 1 capsule (50,000 Units total) by mouth every 7 days.  Dispense: 12 capsule; Refill: 0 and get 15 minutes of sunlight daily wearing sunscreen  - Vitamin D; Future in 12/2024.     7. Bacteria in urine  - Asymptomatic, appears to be contaminated specimen with large squamous cells, repeat Urinalysis, Reflex to Urine Culture; Future, will treat pending results.     8. Proteinuria, unspecified type  - Asymptomatic, appears to be contaminated specimen with large squamous cells, repeat Urinalysis, Reflex to Urine Culture; Future, will treat pending results.         Jaycee was seen today for annual exam.    Diagnoses and all orders for this visit:    Wellness examination  -     Ambulatory referral/consult to Obstetrics / Gynecology; Future    Class 1 obesity due to excess calories without serious comorbidity with body mass index (BMI) of 32.0 to 32.9 in adult    Beta thalassemia minor  -     Ambulatory referral/consult to Hematology / Oncology; Future    Essential thrombocytosis  -     Ambulatory referral/consult to Hematology / Oncology; Future    Iron deficiency anemia, unspecified iron deficiency anemia type  -     Ambulatory referral/consult to Hematology / Oncology; Future    Vitamin D deficiency  -     ergocalciferol (ERGOCALCIFEROL) 50,000 unit Cap; Take 1 capsule (50,000 Units total) by mouth every 7  days.  -     Vitamin D; Future    Bacteria in urine  -     Urinalysis, Reflex to Urine Culture; Future    Proteinuria, unspecified type  -     Urinalysis, Reflex to Urine Culture; Future          Medication List with Changes/Refills   New Medications    ERGOCALCIFEROL (ERGOCALCIFEROL) 50,000 UNIT CAP    Take 1 capsule (50,000 Units total) by mouth every 7 days.       Start Date: 9/12/2024 End Date: 12/11/2024   Current Medications    ALPRAZOLAM (XANAX) 0.25 MG TABLET    Take 1 tablet (0.25 mg total) by mouth daily as needed for Anxiety.       Start Date: 9/11/2023 End Date: --    DICLOFENAC (VOLTAREN) 75 MG EC TABLET    Take 1 tablet (75 mg total) by mouth 2 (two) times daily as needed (pain/inflammatino).       Start Date: 9/28/2023 End Date: --    FERROUS GLUCONATE (FERGON) 324 MG TABLET    Take 1 tablet by mouth 2 (two) times daily with meals.       Start Date: 1/26/2022 End Date: --    LEVOCETIRIZINE (XYZAL) 5 MG TABLET    Take 5 mg by mouth nightly as needed.       Start Date: 1/26/2022 End Date: --   Discontinued Medications    ACETAMINOPHEN (TYLENOL) 650 MG TBSR    Take 1 tablet (650 mg total) by mouth every 8 (eight) hours as needed (Pain).       Start Date: 8/14/2024 End Date: 9/12/2024    GABAPENTIN (NEURONTIN) 100 MG CAPSULE    Take 1 capsule (100 mg total) by mouth 3 (three) times daily.       Start Date: 9/28/2023 End Date: 9/12/2024    IBUPROFEN (ADVIL,MOTRIN) 600 MG TABLET    Take 1 tablet (600 mg total) by mouth every 6 (six) hours as needed for Pain.       Start Date: 8/14/2024 End Date: 9/12/2024          Follow up in about 1 year (around 9/12/2025) for Wellness.

## 2024-09-12 NOTE — PROGRESS NOTES
Health Maintenance Topic(s) Outreach Outcomes & Actions Taken:    Cervical Cancer Screening - Outreach Outcomes & Actions Taken  : Portal message sent to find out if patient had a provider she would like me to send referral too.        Additional Notes:

## 2024-09-12 NOTE — PATIENT INSTRUCTIONS
Jim Champion,     If you are due for any health screening(s) below please notify me so we can arrange them to be ordered and scheduled. Most healthy patients at your age complete them, but you are free to accept or refuse.     If you can't do it, I'll definitely understand. If you can, I'd certainly appreciate it!    Tests to Keep You Healthy    Cervical Cancer Screening: DUE      Your cervical cancer screening is due     Our records indicate that you may be overdue for your screening Pap smear. A Pap smear is an important health screening that can detect abnormal cells that can become cervical cancer. Cervical cancer screenings allow for early diagnosis and increase the likelihood of successful treatment.     The current recommendation for Pap smear screening is every 3-5 years for women at average risk. We encourage you to schedule your appointment with your womens health provider. Many women see a gynecologist for this screening, but some primary care providers also provide Pap screening.     If you recently had your Pap smear screening performed outside of [facility], please let your health care team know so that they can update your health record.

## 2024-09-13 ENCOUNTER — TELEPHONE (OUTPATIENT)
Dept: FAMILY MEDICINE | Facility: CLINIC | Age: 34
End: 2024-09-13
Payer: COMMERCIAL

## 2024-09-13 DIAGNOSIS — R82.71 BACTERIA IN URINE: Primary | ICD-10-CM

## 2024-09-13 NOTE — TELEPHONE ENCOUNTER
----- Message from Janice Stevenson MD sent at 9/13/2024  8:30 AM CDT -----  Urinalysis is negative for protein in urine, there is a small amount of bacteria and squamous cells in her urine, could be due to contaminated specimen or bacterial urinary tract infection, order to add urine culture to lab is in file for further recommendations, my office staff will contact the lab to add.

## 2024-09-14 LAB — BACTERIA UR CULT: ABNORMAL

## 2024-09-16 ENCOUNTER — TELEPHONE (OUTPATIENT)
Dept: FAMILY MEDICINE | Facility: CLINIC | Age: 34
End: 2024-09-16
Payer: COMMERCIAL

## 2024-09-16 DIAGNOSIS — N39.0 BACTERIAL UTI: Primary | ICD-10-CM

## 2024-09-16 DIAGNOSIS — A49.9 BACTERIAL UTI: Primary | ICD-10-CM

## 2024-09-16 RX ORDER — NITROFURANTOIN 25; 75 MG/1; MG/1
100 CAPSULE ORAL EVERY 12 HOURS
Qty: 14 CAPSULE | Refills: 0 | Status: SHIPPED | OUTPATIENT
Start: 2024-09-16 | End: 2024-09-23

## 2024-09-16 NOTE — TELEPHONE ENCOUNTER
----- Message from Janice Stevenson MD sent at 9/16/2024 12:39 PM CDT -----  Final urine culture confirms bacterial urinary tract infection, susceptible to Macrobid, I sent a prescription for Macrobid to take as directed until course is completed.

## 2024-09-17 ENCOUNTER — TELEPHONE (OUTPATIENT)
Dept: FAMILY MEDICINE | Facility: CLINIC | Age: 34
End: 2024-09-17
Payer: COMMERCIAL

## 2024-09-17 NOTE — TELEPHONE ENCOUNTER
----- Message from Robert Langford sent at 9/17/2024  4:26 PM CDT -----  .Type:  Needs Medical Advice    Who Called: Jaycee  Symptoms (please be specific):    How long has patient had these symptoms:    Pharmacy name and phone #:    Would the patient rather a call back or a response via [facility]?   Best Call Back Number: [phone]  Additional Information: Patient requested a call back from the nurse re: a referral that the doctor was suppose to get for her for iron infusion.
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
## 2024-09-25 ENCOUNTER — OFFICE VISIT (OUTPATIENT)
Dept: HEMATOLOGY/ONCOLOGY | Facility: CLINIC | Age: 34
End: 2024-09-25
Payer: COMMERCIAL

## 2024-09-25 VITALS
SYSTOLIC BLOOD PRESSURE: 108 MMHG | HEIGHT: 66 IN | DIASTOLIC BLOOD PRESSURE: 68 MMHG | TEMPERATURE: 98 F | WEIGHT: 198.81 LBS | RESPIRATION RATE: 20 BRPM | OXYGEN SATURATION: 94 % | HEART RATE: 63 BPM | BODY MASS INDEX: 31.95 KG/M2

## 2024-09-25 DIAGNOSIS — D50.9 IRON DEFICIENCY ANEMIA, UNSPECIFIED IRON DEFICIENCY ANEMIA TYPE: Primary | Chronic | ICD-10-CM

## 2024-09-25 DIAGNOSIS — D56.3 BETA THALASSEMIA MINOR: ICD-10-CM

## 2024-09-25 PROBLEM — D47.3 ESSENTIAL THROMBOCYTOSIS: Status: RESOLVED | Noted: 2024-09-12 | Resolved: 2024-09-25

## 2024-09-25 PROCEDURE — 3074F SYST BP LT 130 MM HG: CPT | Mod: CPTII,S$GLB,, | Performed by: NURSE PRACTITIONER

## 2024-09-25 PROCEDURE — 99214 OFFICE O/P EST MOD 30 MIN: CPT | Mod: S$GLB,,, | Performed by: NURSE PRACTITIONER

## 2024-09-25 PROCEDURE — 1159F MED LIST DOCD IN RCRD: CPT | Mod: CPTII,S$GLB,, | Performed by: NURSE PRACTITIONER

## 2024-09-25 PROCEDURE — 3078F DIAST BP <80 MM HG: CPT | Mod: CPTII,S$GLB,, | Performed by: NURSE PRACTITIONER

## 2024-09-25 PROCEDURE — 99999 PR PBB SHADOW E&M-EST. PATIENT-LVL IV: CPT | Mod: PBBFAC,,, | Performed by: NURSE PRACTITIONER

## 2024-09-25 PROCEDURE — 3008F BODY MASS INDEX DOCD: CPT | Mod: CPTII,S$GLB,, | Performed by: NURSE PRACTITIONER

## 2024-09-25 PROCEDURE — 3044F HG A1C LEVEL LT 7.0%: CPT | Mod: CPTII,S$GLB,, | Performed by: NURSE PRACTITIONER

## 2024-09-25 PROCEDURE — 1160F RVW MEDS BY RX/DR IN RCRD: CPT | Mod: CPTII,S$GLB,, | Performed by: NURSE PRACTITIONER

## 2024-09-25 RX ORDER — DIPHENHYDRAMINE HYDROCHLORIDE 50 MG/ML
50 INJECTION INTRAMUSCULAR; INTRAVENOUS ONCE AS NEEDED
OUTPATIENT
Start: 2024-10-03

## 2024-09-25 RX ORDER — EPINEPHRINE 0.3 MG/.3ML
0.3 INJECTION SUBCUTANEOUS ONCE AS NEEDED
OUTPATIENT
Start: 2024-10-03

## 2024-09-25 RX ORDER — SODIUM CHLORIDE 9 MG/ML
INJECTION, SOLUTION INTRAVENOUS CONTINUOUS
OUTPATIENT
Start: 2024-10-03

## 2024-09-25 RX ORDER — HEPARIN 100 UNIT/ML
5 SYRINGE INTRAVENOUS
OUTPATIENT
Start: 2024-10-03

## 2024-09-25 RX ORDER — SODIUM CHLORIDE 0.9 % (FLUSH) 0.9 %
10 SYRINGE (ML) INJECTION
OUTPATIENT
Start: 2024-10-03

## 2024-09-25 NOTE — PROGRESS NOTES
HEMATOLOGY/ONCOLOGY OFFICE CLINIC VISIT    Visit Information:    Initial Evaluation: 10/23/2023  Referring Provider:  Dr Dumont  Other providers:  Code status: Not addressed    Diagnosis:  Beta Thalassemia minor  Iron deficiency anemia    Present treatment:    Treatment/Oncology history:  Oral iron    Plan of care:     Imaging:    Pathology:      CLINICAL HISTORY:       Patient: Jaycee Marie is a 34 y.o. female kindly referred for beta thalassemia minor.    Patient reports that she had several relative from her father's side with beta thalassemia minor but she was just recently diagnosed.  Patient is also more anemic than her baseline and iron deficient.  She reports that her menstrual periods can be very heavy at times or last and asymmetry in 5-7 days.  Patient has been on oral iron off and on.  She has never had blood transfusion.    She is here today with her fiance.  She has 3 children ranging age from 11 to 17. Her daughter has beta thalassemia trait.  Patient reports PICA and tiredness.  She also have chronic joint pain in her right shoulder and knee and lower back.  She was referred to the pain management doctor by her primary care physician.    Chief Complaint: 3 Month Follow Up      Interval History:    Patient presents today with JOCELYNE. She was last seen here in 10/2023 then lost to f/u. She reports profound fatigue and the PICA symptom of craving ice. She has monthly menses. She has GI upset when she takes oral iron. She has beta thalassemia so she has microcytic anemia but she is also iron deficient.      Past Medical History:   Diagnosis Date    History of tubal ligation 9/19/2013    Iron deficiency anemia 5/17/2022    Low back pain 5/17/2022    Obesity 5/17/2022    Prediabetes 5/17/2022    Right knee pain 5/17/2022    Right shoulder pain 5/17/2022    Scoliosis 5/17/2022      Past Surgical History:   Procedure Laterality Date    TONSILLECTOMY N/A     TONSILLECTOMY  2009    TUBAL LIGATION N/A       Family History   Problem Relation Name Age of Onset    Epilepsy Father      Diabetes Sister Cristin             Review of patient's allergies indicates:  No Known Allergies   Current Outpatient Medications on File Prior to Visit   Medication Sig Dispense Refill    ALPRAZolam (XANAX) 0.25 MG tablet Take 1 tablet (0.25 mg total) by mouth daily as needed for Anxiety. 15 tablet 1    diclofenac (VOLTAREN) 75 MG EC tablet Take 1 tablet (75 mg total) by mouth 2 (two) times daily as needed (pain/inflammatino). 60 tablet 2    ergocalciferol (ERGOCALCIFEROL) 50,000 unit Cap Take 1 capsule (50,000 Units total) by mouth every 7 days. 12 capsule 0    levocetirizine (XYZAL) 5 MG tablet Take 5 mg by mouth nightly as needed.      ferrous gluconate (FERGON) 324 MG tablet Take 1 tablet by mouth 2 (two) times daily with meals. (Patient not taking: Reported on 9/12/2024)       No current facility-administered medications on file prior to visit.      Review of Systems   Constitutional:  Positive for fatigue. Negative for activity change, appetite change, chills, fever and unexpected weight change.   HENT:  Negative for mouth dryness, mouth sores, nosebleeds, sore throat and trouble swallowing.    Eyes:  Negative for visual disturbance.   Respiratory:  Negative for cough and shortness of breath.    Cardiovascular:  Negative for chest pain, palpitations and leg swelling.   Gastrointestinal:  Negative for abdominal distention, abdominal pain, blood in stool, change in bowel habit, constipation, diarrhea, nausea and vomiting.   Endocrine: Negative.    Genitourinary:  Negative for dysuria, frequency, hematuria and urgency.   Musculoskeletal:  Positive for arthralgias. Negative for back pain, myalgias and neck pain.   Integumentary:  Negative for rash.   Neurological:  Negative for dizziness, tremors, syncope, speech difficulty, weakness, light-headedness, numbness, headaches and memory loss.   Hematological:  Negative for adenopathy. Does  "not bruise/bleed easily.   Psychiatric/Behavioral:  Negative for confusion and suicidal ideas. The patient is not nervous/anxious.               Vitals:    09/25/24 1424   BP: 108/68   BP Location: Left arm   Patient Position: Sitting   Pulse: 63   Resp: 20   Temp: 97.6 °F (36.4 °C)   TempSrc: Oral   SpO2: (!) 94%   Weight: 90.2 kg (198 lb 12.8 oz)   Height: 5' 6" (1.676 m)        Wt Readings from Last 6 Encounters:   09/25/24 90.2 kg (198 lb 12.8 oz)   09/12/24 90.7 kg (200 lb)   08/14/24 90.2 kg (198 lb 13.7 oz)   03/13/24 86.2 kg (190 lb)   12/13/23 88.1 kg (194 lb 3.2 oz)   10/23/23 86.3 kg (190 lb 4.8 oz)     Body mass index is 32.09 kg/m².  Body surface area is 2.05 meters squared.  Physical Exam  Vitals and nursing note reviewed.   Constitutional:       General: She is not in acute distress.     Appearance: Normal appearance. She is well-developed.   HENT:      Head: Normocephalic and atraumatic.      Mouth/Throat:      Mouth: Mucous membranes are moist.   Eyes:      General: No scleral icterus.     Extraocular Movements: Extraocular movements intact.      Conjunctiva/sclera: Conjunctivae normal.      Pupils: Pupils are equal, round, and reactive to light.   Neck:      Vascular: No JVD.   Cardiovascular:      Rate and Rhythm: Normal rate and regular rhythm.      Heart sounds: No murmur heard.  Pulmonary:      Effort: Pulmonary effort is normal.      Breath sounds: Normal breath sounds. No wheezing or rhonchi.   Abdominal:      General: Bowel sounds are normal. There is no distension.      Palpations: Abdomen is soft. There is no mass.      Tenderness: There is no abdominal tenderness.   Musculoskeletal:         General: No swelling or deformity.      Cervical back: Neck supple.   Lymphadenopathy:      Head:      Right side of head: No submandibular adenopathy.      Left side of head: No submandibular adenopathy.      Cervical: No cervical adenopathy.      Upper Body:      Right upper body: No supraclavicular " or axillary adenopathy.      Left upper body: No supraclavicular or axillary adenopathy.      Lower Body: No right inguinal adenopathy. No left inguinal adenopathy.   Skin:     General: Skin is warm.      Coloration: Skin is not jaundiced.      Findings: No lesion or rash.      Nails: There is no clubbing.   Neurological:      General: No focal deficit present.      Mental Status: She is alert and oriented to person, place, and time.      Cranial Nerves: Cranial nerves 2-12 are intact.   Psychiatric:         Attention and Perception: Attention normal.         Behavior: Behavior is cooperative.         Judgment: Judgment normal.         Laboratory:  CBC with Differential:  Lab Results   Component Value Date    WBC 6.41 09/12/2024    RBC 4.57 09/12/2024    HGB 9.1 (L) 09/12/2024    HCT 30.8 (L) 09/12/2024    MCV 67.4 (L) 09/12/2024    MCH 19.9 (L) 09/12/2024    MCHC 29.5 (L) 09/12/2024    RDW 21.7 (H) 09/12/2024     (H) 09/12/2024    MPV 10.1 09/12/2024        CMP:  Sodium   Date Value Ref Range Status   09/12/2024 140 136 - 145 mmol/L Final     Potassium   Date Value Ref Range Status   09/12/2024 3.7 3.5 - 5.1 mmol/L Final     Chloride   Date Value Ref Range Status   09/12/2024 109 (H) 98 - 107 mmol/L Final     CO2   Date Value Ref Range Status   09/12/2024 24 22 - 29 mmol/L Final     Blood Urea Nitrogen   Date Value Ref Range Status   09/12/2024 9.7 7.0 - 18.7 mg/dL Final     Creatinine   Date Value Ref Range Status   09/12/2024 0.81 0.55 - 1.02 mg/dL Final     Calcium   Date Value Ref Range Status   09/12/2024 9.2 8.4 - 10.2 mg/dL Final     Albumin   Date Value Ref Range Status   09/12/2024 3.6 3.5 - 5.0 g/dL Final   09/11/2023 3.8 g/dL Final     Bilirubin Total   Date Value Ref Range Status   09/12/2024 0.2 <=1.5 mg/dL Final     ALP   Date Value Ref Range Status   09/12/2024 62 40 - 150 unit/L Final     AST   Date Value Ref Range Status   09/12/2024 14 5 - 34 unit/L Final     ALT   Date Value Ref Range  Status   09/12/2024 8 0 - 55 unit/L Final     Estimated GFR-Non    Date Value Ref Range Status   01/20/2022 >60 mL/min/1.73 m2 Final             Assessment:       1. Iron deficiency anemia, unspecified iron deficiency anemia type    2. Beta thalassemia minor        Explained to the patient that Beta-thalassemia minor or trait, is the heterozygous state that is usually asymptomatic with mild anemia.  This is inherited.  Most of the time this patient is do not need any type of treatment at all.  Patient is having menstrual cycles , contributing to her iron deficiency.        Plan:          For JOCELYNE , likely s/t menses, ordered Injectafer x 2 to start next week.   RTC in 3 months with labs to be drawn prior.   Labs: CBC, iron profile and ferritin    FRANCIA Kohler

## 2024-09-26 DIAGNOSIS — M54.16 LUMBAR RADICULOPATHY, CHRONIC: Primary | ICD-10-CM

## 2024-09-26 RX ORDER — IBUPROFEN 600 MG/1
600 TABLET ORAL 3 TIMES DAILY
Qty: 90 TABLET | Refills: 2 | Status: SHIPPED | OUTPATIENT
Start: 2024-09-26

## 2024-09-26 RX ORDER — IBUPROFEN 600 MG/1
600 TABLET ORAL 3 TIMES DAILY
COMMUNITY
End: 2024-09-26 | Stop reason: SDUPTHER

## 2024-09-26 NOTE — TELEPHONE ENCOUNTER
----- Message from Derick Mckeon sent at 9/26/2024  3:36 PM CDT -----  Regarding: refill  Who Called: Jaycee Marie    Refill or New Rx:Refill    RX Name and Strength: ibuprofen (ADVIL,MOTRIN) 600 MG tablet     How is the patient currently taking it? (ex. 1XDay):    Is this a 30 day or 90 day RX:    Local or Mail Order:    List of preferred pharmacies on file (remove unneeded): [unfilled]  If different Pharmacy is requested, enter Pharmacy information here including location and phone number:      Ordering Provider:      Preferred Method of Contact: Phone Call  Patient's Preferred Phone Number on File: [phone]   Best Call Back Number, if different:    Additional Information: pt requesting refill

## 2024-10-01 ENCOUNTER — INFUSION (OUTPATIENT)
Dept: INFUSION THERAPY | Facility: HOSPITAL | Age: 34
End: 2024-10-01
Attending: INTERNAL MEDICINE
Payer: COMMERCIAL

## 2024-10-01 VITALS
OXYGEN SATURATION: 99 % | SYSTOLIC BLOOD PRESSURE: 114 MMHG | HEART RATE: 76 BPM | RESPIRATION RATE: 16 BRPM | DIASTOLIC BLOOD PRESSURE: 65 MMHG

## 2024-10-01 DIAGNOSIS — D50.9 IRON DEFICIENCY ANEMIA, UNSPECIFIED IRON DEFICIENCY ANEMIA TYPE: Primary | ICD-10-CM

## 2024-10-01 PROCEDURE — 96365 THER/PROPH/DIAG IV INF INIT: CPT

## 2024-10-01 PROCEDURE — 63600175 PHARM REV CODE 636 W HCPCS: Mod: JZ,JG | Performed by: NURSE PRACTITIONER

## 2024-10-01 PROCEDURE — 25000003 PHARM REV CODE 250: Performed by: NURSE PRACTITIONER

## 2024-10-01 RX ORDER — HEPARIN 100 UNIT/ML
5 SYRINGE INTRAVENOUS
Status: DISCONTINUED | OUTPATIENT
Start: 2024-10-01 | End: 2024-10-01 | Stop reason: HOSPADM

## 2024-10-01 RX ORDER — DIPHENHYDRAMINE HYDROCHLORIDE 50 MG/ML
50 INJECTION INTRAMUSCULAR; INTRAVENOUS ONCE AS NEEDED
OUTPATIENT
Start: 2024-10-08

## 2024-10-01 RX ORDER — HEPARIN 100 UNIT/ML
5 SYRINGE INTRAVENOUS
OUTPATIENT
Start: 2024-10-08

## 2024-10-01 RX ORDER — SODIUM CHLORIDE 0.9 % (FLUSH) 0.9 %
10 SYRINGE (ML) INJECTION
OUTPATIENT
Start: 2024-10-08

## 2024-10-01 RX ORDER — EPINEPHRINE 0.3 MG/.3ML
0.3 INJECTION SUBCUTANEOUS ONCE AS NEEDED
OUTPATIENT
Start: 2024-10-08

## 2024-10-01 RX ORDER — SODIUM CHLORIDE 9 MG/ML
INJECTION, SOLUTION INTRAVENOUS CONTINUOUS
OUTPATIENT
Start: 2024-10-08

## 2024-10-01 RX ORDER — SODIUM CHLORIDE 0.9 % (FLUSH) 0.9 %
10 SYRINGE (ML) INJECTION
Status: DISCONTINUED | OUTPATIENT
Start: 2024-10-01 | End: 2024-10-01 | Stop reason: HOSPADM

## 2024-10-01 RX ORDER — SODIUM CHLORIDE 9 MG/ML
INJECTION, SOLUTION INTRAVENOUS CONTINUOUS
Status: DISCONTINUED | OUTPATIENT
Start: 2024-10-01 | End: 2024-10-01 | Stop reason: HOSPADM

## 2024-10-01 RX ADMIN — FERRIC CARBOXYMALTOSE INJECTION 750 MG: 50 INJECTION, SOLUTION INTRAVENOUS at 01:10

## 2024-10-01 NOTE — PLAN OF CARE
Problem: Fall Injury Risk  Goal: Absence of Fall and Fall-Related Injury  Outcome: Met  Intervention: Identify and Manage Contributors  Flowsheets (Taken 10/1/2024 1412)  Self-Care Promotion: independence encouraged  Medication Review/Management: infusion initiated  Intervention: Promote Injury-Free Environment  Flowsheets (Taken 10/1/2024 1424)  Safety Promotion/Fall Prevention:   assistive device/personal item within reach   in recliner, wheels locked   Fall Risk reviewed with patient/family     Problem: Fatigue  Goal: Improved Activity Tolerance  Outcome: Met  Intervention: Promote Improved Energy  Flowsheets (Taken 10/1/2024 1424)  Fatigue Management: activity assistance provided  Sleep/Rest Enhancement: relaxation techniques promoted  Activity Management: Ambulated to bathroom - L4  Environmental Support: calm environment promoted

## 2024-10-08 ENCOUNTER — INFUSION (OUTPATIENT)
Dept: INFUSION THERAPY | Facility: HOSPITAL | Age: 34
End: 2024-10-08
Attending: INTERNAL MEDICINE
Payer: COMMERCIAL

## 2024-10-08 VITALS
HEIGHT: 66 IN | RESPIRATION RATE: 18 BRPM | BODY MASS INDEX: 31.66 KG/M2 | TEMPERATURE: 98 F | HEART RATE: 87 BPM | WEIGHT: 197 LBS | SYSTOLIC BLOOD PRESSURE: 111 MMHG | OXYGEN SATURATION: 100 % | DIASTOLIC BLOOD PRESSURE: 69 MMHG

## 2024-10-08 DIAGNOSIS — D50.9 IRON DEFICIENCY ANEMIA, UNSPECIFIED IRON DEFICIENCY ANEMIA TYPE: Primary | ICD-10-CM

## 2024-10-08 PROCEDURE — 96365 THER/PROPH/DIAG IV INF INIT: CPT

## 2024-10-08 PROCEDURE — 25000003 PHARM REV CODE 250: Performed by: NURSE PRACTITIONER

## 2024-10-08 PROCEDURE — 63600175 PHARM REV CODE 636 W HCPCS: Mod: JZ,JG | Performed by: NURSE PRACTITIONER

## 2024-10-08 RX ORDER — HEPARIN 100 UNIT/ML
5 SYRINGE INTRAVENOUS
Status: CANCELLED | OUTPATIENT
Start: 2024-10-08

## 2024-10-08 RX ORDER — SODIUM CHLORIDE 9 MG/ML
INJECTION, SOLUTION INTRAVENOUS CONTINUOUS
Status: CANCELLED | OUTPATIENT
Start: 2024-10-08

## 2024-10-08 RX ORDER — SODIUM CHLORIDE 0.9 % (FLUSH) 0.9 %
10 SYRINGE (ML) INJECTION
Status: CANCELLED | OUTPATIENT
Start: 2024-10-08

## 2024-10-08 RX ORDER — EPINEPHRINE 0.3 MG/.3ML
0.3 INJECTION SUBCUTANEOUS ONCE AS NEEDED
OUTPATIENT
Start: 2024-10-08

## 2024-10-08 RX ORDER — SODIUM CHLORIDE 0.9 % (FLUSH) 0.9 %
10 SYRINGE (ML) INJECTION
Status: DISCONTINUED | OUTPATIENT
Start: 2024-10-08 | End: 2024-10-08 | Stop reason: HOSPADM

## 2024-10-08 RX ORDER — DIPHENHYDRAMINE HYDROCHLORIDE 50 MG/ML
50 INJECTION INTRAMUSCULAR; INTRAVENOUS ONCE AS NEEDED
OUTPATIENT
Start: 2024-10-08

## 2024-10-08 RX ORDER — HEPARIN 100 UNIT/ML
5 SYRINGE INTRAVENOUS
Status: DISCONTINUED | OUTPATIENT
Start: 2024-10-08 | End: 2024-10-08 | Stop reason: HOSPADM

## 2024-10-08 RX ORDER — SODIUM CHLORIDE 9 MG/ML
INJECTION, SOLUTION INTRAVENOUS CONTINUOUS
Status: DISCONTINUED | OUTPATIENT
Start: 2024-10-08 | End: 2024-10-08 | Stop reason: HOSPADM

## 2024-10-08 RX ADMIN — SODIUM CHLORIDE: 9 INJECTION, SOLUTION INTRAVENOUS at 02:10

## 2024-10-08 RX ADMIN — FERRIC CARBOXYMALTOSE INJECTION 750 MG: 50 INJECTION, SOLUTION INTRAVENOUS at 02:10

## 2024-10-08 NOTE — PLAN OF CARE
Injectafer #2/2 given. Tolerated well. Next appts confirmed with pt. Dc'd home in stable condition.

## 2025-01-10 ENCOUNTER — LAB VISIT (OUTPATIENT)
Dept: LAB | Facility: HOSPITAL | Age: 35
End: 2025-01-10
Attending: NURSE PRACTITIONER
Payer: COMMERCIAL

## 2025-01-10 DIAGNOSIS — D50.9 IRON DEFICIENCY ANEMIA, UNSPECIFIED IRON DEFICIENCY ANEMIA TYPE: ICD-10-CM

## 2025-01-10 DIAGNOSIS — D56.3 BETA THALASSEMIA MINOR: ICD-10-CM

## 2025-01-10 LAB
ALBUMIN SERPL-MCNC: 4.1 G/DL (ref 3.5–5)
ALBUMIN/GLOB SERPL: 1.1 RATIO (ref 1.1–2)
ALP SERPL-CCNC: 76 UNIT/L (ref 40–150)
ALT SERPL-CCNC: 15 UNIT/L (ref 0–55)
ANION GAP SERPL CALC-SCNC: 6 MEQ/L
AST SERPL-CCNC: 16 UNIT/L (ref 5–34)
BASOPHILS # BLD AUTO: 0.03 X10(3)/MCL
BASOPHILS NFR BLD AUTO: 0.5 %
BILIRUB SERPL-MCNC: 0.2 MG/DL
BUN SERPL-MCNC: 4.6 MG/DL (ref 7–18.7)
CALCIUM SERPL-MCNC: 9.2 MG/DL (ref 8.4–10.2)
CHLORIDE SERPL-SCNC: 104 MMOL/L (ref 98–107)
CO2 SERPL-SCNC: 27 MMOL/L (ref 22–29)
CREAT SERPL-MCNC: 0.8 MG/DL (ref 0.55–1.02)
CREAT/UREA NIT SERPL: 6
EOSINOPHIL # BLD AUTO: 0.05 X10(3)/MCL (ref 0–0.9)
EOSINOPHIL NFR BLD AUTO: 0.9 %
ERYTHROCYTE [DISTWIDTH] IN BLOOD BY AUTOMATED COUNT: 14.9 % (ref 11.5–17)
FERRITIN SERPL-MCNC: 137.42 NG/ML (ref 4.63–204)
GFR SERPLBLD CREATININE-BSD FMLA CKD-EPI: >60 ML/MIN/1.73/M2
GLOBULIN SER-MCNC: 3.7 GM/DL (ref 2.4–3.5)
GLUCOSE SERPL-MCNC: 89 MG/DL (ref 74–100)
HCT VFR BLD AUTO: 39.4 % (ref 37–47)
HGB BLD-MCNC: 12.5 G/DL (ref 12–16)
IMM GRANULOCYTES # BLD AUTO: 0 X10(3)/MCL (ref 0–0.04)
IMM GRANULOCYTES NFR BLD AUTO: 0 %
IRON SATN MFR SERPL: 31 % (ref 20–50)
IRON SERPL-MCNC: 72 UG/DL (ref 50–170)
LYMPHOCYTES # BLD AUTO: 2.64 X10(3)/MCL (ref 0.6–4.6)
LYMPHOCYTES NFR BLD AUTO: 48.2 %
MCH RBC QN AUTO: 24 PG (ref 27–31)
MCHC RBC AUTO-ENTMCNC: 31.7 G/DL (ref 33–36)
MCV RBC AUTO: 75.6 FL (ref 80–94)
MONOCYTES # BLD AUTO: 0.58 X10(3)/MCL (ref 0.1–1.3)
MONOCYTES NFR BLD AUTO: 10.6 %
NEUTROPHILS # BLD AUTO: 2.18 X10(3)/MCL (ref 2.1–9.2)
NEUTROPHILS NFR BLD AUTO: 39.8 %
PLATELET # BLD AUTO: 283 X10(3)/MCL (ref 130–400)
PMV BLD AUTO: 10 FL (ref 7.4–10.4)
POTASSIUM SERPL-SCNC: 3.9 MMOL/L (ref 3.5–5.1)
PROT SERPL-MCNC: 7.8 GM/DL (ref 6.4–8.3)
RBC # BLD AUTO: 5.21 X10(6)/MCL (ref 4.2–5.4)
SODIUM SERPL-SCNC: 137 MMOL/L (ref 136–145)
TIBC SERPL-MCNC: 157 UG/DL (ref 70–310)
TIBC SERPL-MCNC: 229 UG/DL (ref 250–450)
TRANSFERRIN SERPL-MCNC: 207 MG/DL (ref 180–382)
WBC # BLD AUTO: 5.48 X10(3)/MCL (ref 4.5–11.5)

## 2025-01-10 PROCEDURE — 82728 ASSAY OF FERRITIN: CPT

## 2025-01-10 PROCEDURE — 80053 COMPREHEN METABOLIC PANEL: CPT

## 2025-01-10 PROCEDURE — 85025 COMPLETE CBC W/AUTO DIFF WBC: CPT

## 2025-01-10 PROCEDURE — 36415 COLL VENOUS BLD VENIPUNCTURE: CPT

## 2025-01-10 PROCEDURE — 83550 IRON BINDING TEST: CPT

## 2025-01-16 ENCOUNTER — OFFICE VISIT (OUTPATIENT)
Dept: HEMATOLOGY/ONCOLOGY | Facility: CLINIC | Age: 35
End: 2025-01-16
Payer: COMMERCIAL

## 2025-01-16 VITALS
HEART RATE: 74 BPM | SYSTOLIC BLOOD PRESSURE: 106 MMHG | RESPIRATION RATE: 20 BRPM | DIASTOLIC BLOOD PRESSURE: 53 MMHG | HEIGHT: 66 IN | TEMPERATURE: 98 F | WEIGHT: 200.31 LBS | OXYGEN SATURATION: 99 % | BODY MASS INDEX: 32.19 KG/M2

## 2025-01-16 DIAGNOSIS — D56.3 BETA THALASSEMIA MINOR: ICD-10-CM

## 2025-01-16 DIAGNOSIS — D50.9 IRON DEFICIENCY ANEMIA, UNSPECIFIED IRON DEFICIENCY ANEMIA TYPE: Primary | Chronic | ICD-10-CM

## 2025-01-16 PROCEDURE — 99999 PR PBB SHADOW E&M-EST. PATIENT-LVL IV: CPT | Mod: PBBFAC,,, | Performed by: NURSE PRACTITIONER

## 2025-01-16 PROCEDURE — 3008F BODY MASS INDEX DOCD: CPT | Mod: CPTII,S$GLB,, | Performed by: NURSE PRACTITIONER

## 2025-01-16 PROCEDURE — 3074F SYST BP LT 130 MM HG: CPT | Mod: CPTII,S$GLB,, | Performed by: NURSE PRACTITIONER

## 2025-01-16 PROCEDURE — 99213 OFFICE O/P EST LOW 20 MIN: CPT | Mod: S$GLB,,, | Performed by: NURSE PRACTITIONER

## 2025-01-16 PROCEDURE — 1159F MED LIST DOCD IN RCRD: CPT | Mod: CPTII,S$GLB,, | Performed by: NURSE PRACTITIONER

## 2025-01-16 PROCEDURE — 3078F DIAST BP <80 MM HG: CPT | Mod: CPTII,S$GLB,, | Performed by: NURSE PRACTITIONER

## 2025-01-16 NOTE — PROGRESS NOTES
HEMATOLOGY/ONCOLOGY OFFICE CLINIC VISIT    Visit Information:    Initial Evaluation: 10/23/2023  Referring Provider:  Dr Dumont  Other providers:  Code status: Not addressed    Diagnosis:  Beta Thalassemia minor  Iron deficiency anemia    Present treatment:    Treatment/Oncology history:  Oral iron    Plan of care:     Imaging:    Pathology:      CLINICAL HISTORY:       Patient: Jaycee Marie is a 34 y.o. female kindly referred for beta thalassemia minor.    Patient reports that she had several relative from her father's side with beta thalassemia minor but she was just recently diagnosed.  Patient is also more anemic than her baseline and iron deficient.  She reports that her menstrual periods can be very heavy at times or last and asymmetry in 5-7 days.  Patient has been on oral iron off and on.  She has never had blood transfusion.    She is here today with her fiance.  She has 3 children ranging age from 11 to 17. Her daughter has beta thalassemia trait.  Patient reports PICA and tiredness.  She also have chronic joint pain in her right shoulder and knee and lower back.  She was referred to the pain management doctor by her primary care physician.    Chief Complaint: 16 Week Follow Up      Interval History:    Patient presents today for f/u for JOCELYNE. In the interim, she received IV iron in 10/2024- Anemia resolved. Iron profile normalized. Still with microcytosis. She has beta thalassemia She reports profound fatigue and the PICA symptom of craving ice as resolved.    Past Medical History:   Diagnosis Date    History of tubal ligation 9/19/2013    Iron deficiency anemia 5/17/2022    Low back pain 5/17/2022    Obesity 5/17/2022    Prediabetes 5/17/2022    Right knee pain 5/17/2022    Right shoulder pain 5/17/2022    Scoliosis 5/17/2022      Past Surgical History:   Procedure Laterality Date    TONSILLECTOMY N/A     TONSILLECTOMY  2009    TUBAL LIGATION N/A      Family History   Problem Relation Name Age of  Onset    Epilepsy Father      Diabetes Sister Cristin             Review of patient's allergies indicates:  No Known Allergies   Current Outpatient Medications on File Prior to Visit   Medication Sig Dispense Refill    ALPRAZolam (XANAX) 0.25 MG tablet Take 1 tablet (0.25 mg total) by mouth daily as needed for Anxiety. 15 tablet 1    diclofenac (VOLTAREN) 75 MG EC tablet Take 1 tablet (75 mg total) by mouth 2 (two) times daily as needed (pain/inflammatino). 60 tablet 2    ibuprofen (ADVIL,MOTRIN) 600 MG tablet Take 1 tablet (600 mg total) by mouth 3 (three) times daily. 90 tablet 2    levocetirizine (XYZAL) 5 MG tablet Take 5 mg by mouth nightly as needed.      [DISCONTINUED] ferrous gluconate (FERGON) 324 MG tablet Take 1 tablet by mouth 2 (two) times daily with meals. (Patient not taking: Reported on 9/12/2024)       No current facility-administered medications on file prior to visit.      Review of Systems   Constitutional:  Negative for activity change, appetite change, chills, fatigue, fever and unexpected weight change.   HENT:  Negative for mouth dryness, mouth sores, nosebleeds, sore throat and trouble swallowing.    Eyes:  Negative for visual disturbance.   Respiratory:  Negative for cough and shortness of breath.    Cardiovascular:  Negative for chest pain, palpitations and leg swelling.   Gastrointestinal:  Negative for abdominal distention, abdominal pain, blood in stool, change in bowel habit, constipation, diarrhea, nausea and vomiting.   Endocrine: Negative.    Genitourinary:  Negative for dysuria, frequency, hematuria and urgency.   Musculoskeletal:  Positive for arthralgias. Negative for back pain, myalgias and neck pain.   Integumentary:  Negative for rash.   Neurological:  Negative for dizziness, tremors, syncope, speech difficulty, weakness, light-headedness, numbness, headaches and memory loss.   Hematological:  Negative for adenopathy. Does not bruise/bleed easily.   Psychiatric/Behavioral:   "Negative for confusion and suicidal ideas. The patient is not nervous/anxious.               Vitals:    01/16/25 1319   BP: (!) 106/53   BP Location: Left arm   Patient Position: Sitting   Pulse: 74   Resp: 20   Temp: 98.2 °F (36.8 °C)   TempSrc: Oral   SpO2: 99%   Weight: 90.9 kg (200 lb 4.8 oz)   Height: 5' 6" (1.676 m)        Wt Readings from Last 6 Encounters:   01/16/25 90.9 kg (200 lb 4.8 oz)   10/08/24 89.4 kg (197 lb)   09/25/24 90.2 kg (198 lb 12.8 oz)   09/12/24 90.7 kg (200 lb)   08/14/24 90.2 kg (198 lb 13.7 oz)   03/13/24 86.2 kg (190 lb)     Body mass index is 32.33 kg/m².  Body surface area is 2.06 meters squared.  Physical Exam  Vitals and nursing note reviewed.   Constitutional:       General: She is not in acute distress.     Appearance: Normal appearance. She is well-developed.   HENT:      Head: Normocephalic and atraumatic.      Mouth/Throat:      Mouth: Mucous membranes are moist.   Eyes:      General: No scleral icterus.     Extraocular Movements: Extraocular movements intact.      Conjunctiva/sclera: Conjunctivae normal.      Pupils: Pupils are equal, round, and reactive to light.   Neck:      Vascular: No JVD.   Cardiovascular:      Rate and Rhythm: Normal rate and regular rhythm.      Heart sounds: No murmur heard.  Pulmonary:      Effort: Pulmonary effort is normal.      Breath sounds: Normal breath sounds. No wheezing or rhonchi.   Abdominal:      General: Bowel sounds are normal. There is no distension.      Palpations: Abdomen is soft. There is no mass.      Tenderness: There is no abdominal tenderness.   Musculoskeletal:         General: No swelling or deformity.      Cervical back: Neck supple.   Lymphadenopathy:      Head:      Right side of head: No submandibular adenopathy.      Left side of head: No submandibular adenopathy.      Cervical: No cervical adenopathy.      Upper Body:      Right upper body: No supraclavicular or axillary adenopathy.      Left upper body: No " supraclavicular or axillary adenopathy.      Lower Body: No right inguinal adenopathy. No left inguinal adenopathy.   Skin:     General: Skin is warm.      Coloration: Skin is not jaundiced.      Findings: No lesion or rash.      Nails: There is no clubbing.   Neurological:      General: No focal deficit present.      Mental Status: She is alert and oriented to person, place, and time.      Cranial Nerves: Cranial nerves 2-12 are intact.   Psychiatric:         Attention and Perception: Attention normal.         Behavior: Behavior is cooperative.         Judgment: Judgment normal.       Laboratory:  CBC with Differential:  Lab Results   Component Value Date    WBC 5.48 01/10/2025    RBC 5.21 01/10/2025    HGB 12.5 01/10/2025    HCT 39.4 01/10/2025    MCV 75.6 (L) 01/10/2025    MCH 24.0 (L) 01/10/2025    MCHC 31.7 (L) 01/10/2025    RDW 14.9 01/10/2025     01/10/2025    MPV 10.0 01/10/2025        CMP:  Sodium   Date Value Ref Range Status   01/10/2025 137 136 - 145 mmol/L Final     Potassium   Date Value Ref Range Status   01/10/2025 3.9 3.5 - 5.1 mmol/L Final     Chloride   Date Value Ref Range Status   01/10/2025 104 98 - 107 mmol/L Final     CO2   Date Value Ref Range Status   01/10/2025 27 22 - 29 mmol/L Final     Blood Urea Nitrogen   Date Value Ref Range Status   01/10/2025 4.6 (L) 7.0 - 18.7 mg/dL Final     Creatinine   Date Value Ref Range Status   01/10/2025 0.80 0.55 - 1.02 mg/dL Final     Calcium   Date Value Ref Range Status   01/10/2025 9.2 8.4 - 10.2 mg/dL Final     Albumin   Date Value Ref Range Status   01/10/2025 4.1 3.5 - 5.0 g/dL Final   09/11/2023 3.8 g/dL Final     Bilirubin Total   Date Value Ref Range Status   01/10/2025 0.2 <=1.5 mg/dL Final     ALP   Date Value Ref Range Status   01/10/2025 76 40 - 150 unit/L Final     AST   Date Value Ref Range Status   01/10/2025 16 5 - 34 unit/L Final     ALT   Date Value Ref Range Status   01/10/2025 15 0 - 55 unit/L Final     Estimated GFR-Non     Date Value Ref Range Status   01/20/2022 >60 mL/min/1.73 m2 Final             Assessment:       1. Iron deficiency anemia, unspecified iron deficiency anemia type    2. Beta thalassemia minor          Explained to the patient that Beta-thalassemia minor or trait, is the heterozygous state that is usually asymptomatic with mild anemia.  This is inherited.  Most of the time this patient is do not need any type of treatment at all.  Patient is having menstrual cycles , contributing to her iron deficiency.        Plan:          JOCELYNE resolved with Injectafer x 2.   Will continue to monitor for now.  RTC in 6 months with labs to be drawn prior.   Labs: CBC, iron profile and ferritin    FRANCIA Kohler

## 2025-01-24 LAB
HPV16+18+H RISK 12 DNA CVX-IMP: POSITIVE
PAP RECOMMENDATION EXT: NORMAL
PAP SMEAR: NORMAL

## 2025-04-14 ENCOUNTER — DOCUMENTATION ONLY (OUTPATIENT)
Facility: CLINIC | Age: 35
End: 2025-04-14
Payer: COMMERCIAL

## 2025-05-28 DIAGNOSIS — E55.9 VITAMIN D DEFICIENCY: ICD-10-CM

## 2025-05-28 RX ORDER — ERGOCALCIFEROL 1.25 MG/1
50000 CAPSULE ORAL
Qty: 12 CAPSULE | Refills: 0 | Status: SHIPPED | OUTPATIENT
Start: 2025-05-28